# Patient Record
Sex: FEMALE | Race: WHITE | NOT HISPANIC OR LATINO | Employment: OTHER | URBAN - METROPOLITAN AREA
[De-identification: names, ages, dates, MRNs, and addresses within clinical notes are randomized per-mention and may not be internally consistent; named-entity substitution may affect disease eponyms.]

---

## 2022-06-22 ENCOUNTER — NURSE TRIAGE (OUTPATIENT)
Dept: NURSING | Facility: CLINIC | Age: 72
End: 2022-06-22

## 2022-06-22 NOTE — TELEPHONE ENCOUNTER
Daughter calling  She is reporting she is on the way to  her mom at her home in [facility].  She is reporting her mom is an alcoholic, and has been for a lng time. She reports she does not eat or take care of herself in anyway.   Daughter is asking where she can bring her for Detox , and assessment.for treatment.  She was advised to take her to [facility], and she was given the address,    Celina Chun RN   [facility] Nurse Advisor        Reason for Disposition    Substance abuse or dependence: question or problem related to    Depression is main problem or symptom (e.g., feelings of sadness or hopelessness)    Depression and unable to do any of normal activities (e.g., self care, school, work; in comparison to baseline).    Additional Information    Negative: Coma (e.g., not moving, not talking, not responding to stimuli)    Negative: Difficult to awaken or acting confused (e.g., disoriented, slurred speech)    Negative: Seeing, hearing, or feeling things that are not there (i.e., visual, auditory, or tactile hallucinations)    Negative: Slow, shallow and weak breathing    Negative: Seizure    Negative: Violent behavior, or threatening to physically hurt or kill someone    Negative: Patient attempted suicide    Negative: Threatening suicide    Negative: Sounds like a life-threatening emergency to the triager    Negative: Coma (e.g., not moving, not talking, not responding to stimuli)    Negative: Difficult to awaken or acting confused (e.g., disoriented, slurred speech)    Negative: Seeing or hearing or feeling things that are not there (i.e., auditory, visual, or tactile hallucinations)    Negative: Slow, shallow and weak breathing    Negative: Seizure    Negative: Violent behavior, or threatening to physically hurt or kill someone    Negative: Sounds like a life-threatening emergency to the triager    Negative: Patient attempted suicide    Negative: Patient is threatening suicide  now    Negative: Violent behavior, or threatening to physically hurt or kill someone    Negative: Patient is very confused (disoriented, slurred speech) and no other adult (e.g., friend or family member) available    Negative: Difficult to awaken or acting very confused (disoriented, slurred speech) and new onset    Negative: Sounds like a life-threatening emergency to the triager    Negative: Alcohol use, abuse or dependence, question or problem related to    Negative: Drug abuse or dependence, question or problem related to    Negative: Suicide thoughts, threats, attempts, or questions    Negative: Anxiety is main problem or symptom    Protocols used: ALCOHOL ABUSE AND [id number]-ES, SUBSTANCE ABUSE AND [id number]-S-CH, DEPRESSION-A-OH

## 2022-06-28 ENCOUNTER — HOSPITAL ENCOUNTER (INPATIENT)
Facility: CLINIC | Age: 72
LOS: 9 days | Discharge: HOME | DRG: 885 | End: 2022-07-07
Attending: FAMILY MEDICINE | Admitting: INTERNAL MEDICINE
Payer: MEDICARE

## 2022-06-28 DIAGNOSIS — Z20.822 LAB TEST NEGATIVE FOR COVID-19 VIRUS: ICD-10-CM

## 2022-06-28 DIAGNOSIS — E87.1 HYPONATREMIA: ICD-10-CM

## 2022-06-28 DIAGNOSIS — F10.930 ALCOHOL WITHDRAWAL SYNDROME WITHOUT COMPLICATION (H): Primary | ICD-10-CM

## 2022-06-28 DIAGNOSIS — F32.A DEPRESSION, UNSPECIFIED DEPRESSION TYPE: ICD-10-CM

## 2022-06-28 LAB
ALBUMIN SERPL-MCNC: 2.8 G/DL (ref 3.4–5)
ALBUMIN UR-MCNC: 30 MG/DL
ALCOHOL BREATH TEST: 0.07 (ref 0–0.01)
ALP SERPL-CCNC: 174 U/L (ref 40–150)
ALT SERPL W P-5'-P-CCNC: 164 U/L (ref 0–50)
AMPHETAMINES UR QL SCN: NORMAL
ANION GAP SERPL CALCULATED.3IONS-SCNC: 13 MMOL/L (ref 3–14)
APPEARANCE UR: ABNORMAL
AST SERPL W P-5'-P-CCNC: 254 U/L (ref 0–45)
BACTERIA #/AREA URNS HPF: ABNORMAL /HPF
BARBITURATES UR QL: NORMAL
BASOPHILS # BLD AUTO: 0 10E3/UL (ref 0–0.2)
BASOPHILS NFR BLD AUTO: 1 %
BENZODIAZ UR QL: NORMAL
BILIRUB SERPL-MCNC: 1 MG/DL (ref 0.2–1.3)
BILIRUB UR QL STRIP: NEGATIVE
BUN SERPL-MCNC: 8 MG/DL (ref 7–30)
CALCIUM SERPL-MCNC: 8.2 MG/DL (ref 8.5–10.1)
CANNABINOIDS UR QL SCN: NORMAL
CHLORIDE BLD-SCNC: 92 MMOL/L (ref 94–109)
CO2 SERPL-SCNC: 21 MMOL/L (ref 20–32)
COCAINE UR QL: NORMAL
COLOR UR AUTO: YELLOW
CREAT SERPL-MCNC: 0.64 MG/DL (ref 0.52–1.04)
EOSINOPHIL # BLD AUTO: 0 10E3/UL (ref 0–0.7)
EOSINOPHIL NFR BLD AUTO: 0 %
ERYTHROCYTE [DISTWIDTH] IN BLOOD BY AUTOMATED COUNT: 14.2 % (ref 10–15)
ETHANOL SERPL-MCNC: 0.08 G/DL
GFR SERPL CREATININE-BSD FRML MDRD: >90 ML/MIN/1.73M2
GLUCOSE BLD-MCNC: 97 MG/DL (ref 70–99)
GLUCOSE UR STRIP-MCNC: NEGATIVE MG/DL
HCT VFR BLD AUTO: 32.4 % (ref 35–47)
HGB BLD-MCNC: 11.8 G/DL (ref 11.7–15.7)
HGB UR QL STRIP: NEGATIVE
HYALINE CASTS: 34 /LPF
IMM GRANULOCYTES # BLD: 0.1 10E3/UL
IMM GRANULOCYTES NFR BLD: 1 %
KETONES UR STRIP-MCNC: 20 MG/DL
LEUKOCYTE ESTERASE UR QL STRIP: ABNORMAL
LIPASE SERPL-CCNC: 606 U/L (ref 73–393)
LYMPHOCYTES # BLD AUTO: 0.4 10E3/UL (ref 0.8–5.3)
LYMPHOCYTES NFR BLD AUTO: 6 %
MAGNESIUM SERPL-MCNC: 0.8 MG/DL (ref 1.6–2.3)
MCH RBC QN AUTO: 35.4 PG (ref 26.5–33)
MCHC RBC AUTO-ENTMCNC: 36.4 G/DL (ref 31.5–36.5)
MCV RBC AUTO: 97 FL (ref 78–100)
MONOCYTES # BLD AUTO: 0.8 10E3/UL (ref 0–1.3)
MONOCYTES NFR BLD AUTO: 13 %
MUCOUS THREADS #/AREA URNS LPF: PRESENT /LPF
NEUTROPHILS # BLD AUTO: 5 10E3/UL (ref 1.6–8.3)
NEUTROPHILS NFR BLD AUTO: 79 %
NITRATE UR QL: POSITIVE
NRBC # BLD AUTO: 0 10E3/UL
NRBC BLD AUTO-RTO: 0 /100
OPIATES UR QL SCN: NORMAL
PH UR STRIP: 5.5 [PH] (ref 5–7)
PHOSPHATE SERPL-MCNC: 2.1 MG/DL (ref 2.5–4.5)
PLATELET # BLD AUTO: 92 10E3/UL (ref 150–450)
POTASSIUM BLD-SCNC: 3.9 MMOL/L (ref 3.4–5.3)
PROT SERPL-MCNC: 7.2 G/DL (ref 6.8–8.8)
RBC # BLD AUTO: 3.33 10E6/UL (ref 3.8–5.2)
RBC URINE: 1 /HPF
SARS-COV-2 RNA RESP QL NAA+PROBE: NEGATIVE
SODIUM SERPL-SCNC: 126 MMOL/L (ref 133–144)
SODIUM UR-SCNC: 37 MMOL/L
SP GR UR STRIP: 1.02 (ref 1–1.03)
SQUAMOUS EPITHELIAL: <1 /HPF
TRANSITIONAL EPI: 1 /HPF
TSH SERPL DL<=0.005 MIU/L-ACNC: 2.79 MU/L (ref 0.4–4)
UROBILINOGEN UR STRIP-MCNC: NORMAL MG/DL
WBC # BLD AUTO: 6.3 10E3/UL (ref 4–11)
WBC URINE: 25 /HPF

## 2022-06-28 PROCEDURE — 83735 ASSAY OF MAGNESIUM: CPT | Performed by: INTERNAL MEDICINE

## 2022-06-28 PROCEDURE — 82077 ASSAY SPEC XCP UR&BREATH IA: CPT | Performed by: FAMILY MEDICINE

## 2022-06-28 PROCEDURE — 80307 DRUG TEST PRSMV CHEM ANLYZR: CPT | Performed by: FAMILY MEDICINE

## 2022-06-28 PROCEDURE — 84443 ASSAY THYROID STIM HORMONE: CPT | Performed by: FAMILY MEDICINE

## 2022-06-28 PROCEDURE — 258N000003 HC RX IP 258 OP 636: Performed by: INTERNAL MEDICINE

## 2022-06-28 PROCEDURE — 250N000011 HC RX IP 250 OP 636: Performed by: EMERGENCY MEDICINE

## 2022-06-28 PROCEDURE — 96361 HYDRATE IV INFUSION ADD-ON: CPT | Performed by: EMERGENCY MEDICINE

## 2022-06-28 PROCEDURE — 82075 ASSAY OF BREATH ETHANOL: CPT | Performed by: EMERGENCY MEDICINE

## 2022-06-28 PROCEDURE — 87086 URINE CULTURE/COLONY COUNT: CPT | Performed by: FAMILY MEDICINE

## 2022-06-28 PROCEDURE — 84100 ASSAY OF PHOSPHORUS: CPT | Performed by: INTERNAL MEDICINE

## 2022-06-28 PROCEDURE — 250N000011 HC RX IP 250 OP 636: Performed by: INTERNAL MEDICINE

## 2022-06-28 PROCEDURE — 250N000013 HC RX MED GY IP 250 OP 250 PS 637: Performed by: EMERGENCY MEDICINE

## 2022-06-28 PROCEDURE — 82310 ASSAY OF CALCIUM: CPT | Performed by: FAMILY MEDICINE

## 2022-06-28 PROCEDURE — 84300 ASSAY OF URINE SODIUM: CPT | Performed by: INTERNAL MEDICINE

## 2022-06-28 PROCEDURE — 96374 THER/PROPH/DIAG INJ IV PUSH: CPT | Performed by: EMERGENCY MEDICINE

## 2022-06-28 PROCEDURE — 120N000002 HC R&B MED SURG/OB UMMC

## 2022-06-28 PROCEDURE — 99285 EMERGENCY DEPT VISIT HI MDM: CPT | Performed by: EMERGENCY MEDICINE

## 2022-06-28 PROCEDURE — 85025 COMPLETE CBC W/AUTO DIFF WBC: CPT | Performed by: FAMILY MEDICINE

## 2022-06-28 PROCEDURE — 90791 PSYCH DIAGNOSTIC EVALUATION: CPT

## 2022-06-28 PROCEDURE — 99285 EMERGENCY DEPT VISIT HI MDM: CPT | Mod: 25 | Performed by: EMERGENCY MEDICINE

## 2022-06-28 PROCEDURE — 258N000003 HC RX IP 258 OP 636: Performed by: EMERGENCY MEDICINE

## 2022-06-28 PROCEDURE — 83690 ASSAY OF LIPASE: CPT | Performed by: FAMILY MEDICINE

## 2022-06-28 PROCEDURE — 99232 SBSQ HOSP IP/OBS MODERATE 35: CPT | Performed by: INTERNAL MEDICINE

## 2022-06-28 PROCEDURE — 250N000013 HC RX MED GY IP 250 OP 250 PS 637: Performed by: INTERNAL MEDICINE

## 2022-06-28 PROCEDURE — 250N000009 HC RX 250: Performed by: INTERNAL MEDICINE

## 2022-06-28 PROCEDURE — 36415 COLL VENOUS BLD VENIPUNCTURE: CPT | Performed by: FAMILY MEDICINE

## 2022-06-28 PROCEDURE — 87635 SARS-COV-2 COVID-19 AMP PRB: CPT | Performed by: EMERGENCY MEDICINE

## 2022-06-28 PROCEDURE — C9803 HOPD COVID-19 SPEC COLLECT: HCPCS | Performed by: EMERGENCY MEDICINE

## 2022-06-28 PROCEDURE — 81001 URINALYSIS AUTO W/SCOPE: CPT | Performed by: FAMILY MEDICINE

## 2022-06-28 RX ORDER — DIAZEPAM 10 MG/2ML
5-10 INJECTION, SOLUTION INTRAMUSCULAR; INTRAVENOUS EVERY 30 MIN PRN
Status: DISCONTINUED | OUTPATIENT
Start: 2022-06-28 | End: 2022-07-06

## 2022-06-28 RX ORDER — GABAPENTIN 300 MG/1
900 CAPSULE ORAL EVERY 8 HOURS
Status: DISCONTINUED | OUTPATIENT
Start: 2022-06-29 | End: 2022-06-30

## 2022-06-28 RX ORDER — POTASSIUM CHLORIDE 1500 MG/1
20 TABLET, EXTENDED RELEASE ORAL DAILY
COMMUNITY
Start: 2022-01-27

## 2022-06-28 RX ORDER — ONDANSETRON 2 MG/ML
8 INJECTION INTRAMUSCULAR; INTRAVENOUS ONCE
Status: COMPLETED | OUTPATIENT
Start: 2022-06-28 | End: 2022-06-28

## 2022-06-28 RX ORDER — MULTIPLE VITAMINS W/ MINERALS TAB 9MG-400MCG
1 TAB ORAL DAILY
Status: DISCONTINUED | OUTPATIENT
Start: 2022-06-29 | End: 2022-07-07 | Stop reason: HOSPADM

## 2022-06-28 RX ORDER — BUPROPION HYDROCHLORIDE 300 MG/1
300 TABLET ORAL DAILY
Status: ON HOLD | COMMUNITY
Start: 2021-12-14 | End: 2022-07-07

## 2022-06-28 RX ORDER — GABAPENTIN 300 MG/1
300 CAPSULE ORAL EVERY 8 HOURS
Status: DISCONTINUED | OUTPATIENT
Start: 2022-07-04 | End: 2022-06-30

## 2022-06-28 RX ORDER — LOSARTAN POTASSIUM 100 MG/1
100 TABLET ORAL DAILY
COMMUNITY
Start: 2022-03-06

## 2022-06-28 RX ORDER — GABAPENTIN 100 MG/1
100 CAPSULE ORAL EVERY 8 HOURS
Status: DISCONTINUED | OUTPATIENT
Start: 2022-07-06 | End: 2022-06-30

## 2022-06-28 RX ORDER — CLONIDINE HYDROCHLORIDE 0.1 MG/1
0.1 TABLET ORAL EVERY 8 HOURS
Status: DISCONTINUED | OUTPATIENT
Start: 2022-06-28 | End: 2022-07-05

## 2022-06-28 RX ORDER — NYSTATIN 100000 [USP'U]/G
POWDER TOPICAL
COMMUNITY
Start: 2022-03-11

## 2022-06-28 RX ORDER — ALBUTEROL SULFATE 90 UG/1
AEROSOL, METERED RESPIRATORY (INHALATION)
COMMUNITY
Start: 2022-05-18

## 2022-06-28 RX ORDER — HALOPERIDOL 5 MG/ML
2.5-5 INJECTION INTRAMUSCULAR EVERY 6 HOURS PRN
Status: DISCONTINUED | OUTPATIENT
Start: 2022-06-28 | End: 2022-07-06

## 2022-06-28 RX ORDER — GABAPENTIN 300 MG/1
600 CAPSULE ORAL EVERY 8 HOURS
Status: DISCONTINUED | OUTPATIENT
Start: 2022-07-02 | End: 2022-06-30

## 2022-06-28 RX ORDER — SODIUM CHLORIDE 9 MG/ML
INJECTION, SOLUTION INTRAVENOUS CONTINUOUS
Status: DISCONTINUED | OUTPATIENT
Start: 2022-06-28 | End: 2022-07-02

## 2022-06-28 RX ORDER — FLUMAZENIL 0.1 MG/ML
0.2 INJECTION, SOLUTION INTRAVENOUS
Status: DISCONTINUED | OUTPATIENT
Start: 2022-06-28 | End: 2022-07-07 | Stop reason: HOSPADM

## 2022-06-28 RX ORDER — FOLIC ACID 1 MG/1
1 TABLET ORAL DAILY
Status: DISCONTINUED | OUTPATIENT
Start: 2022-06-29 | End: 2022-07-07 | Stop reason: HOSPADM

## 2022-06-28 RX ORDER — PANTOPRAZOLE SODIUM 40 MG/1
40 TABLET, DELAYED RELEASE ORAL
Status: DISCONTINUED | OUTPATIENT
Start: 2022-06-29 | End: 2022-07-07 | Stop reason: HOSPADM

## 2022-06-28 RX ORDER — OLANZAPINE 5 MG/1
5-10 TABLET, ORALLY DISINTEGRATING ORAL EVERY 6 HOURS PRN
Status: DISCONTINUED | OUTPATIENT
Start: 2022-06-28 | End: 2022-07-06

## 2022-06-28 RX ORDER — MULTIPLE VITAMINS W/ MINERALS TAB 9MG-400MCG
1 TAB ORAL DAILY
Status: DISCONTINUED | OUTPATIENT
Start: 2022-06-28 | End: 2022-06-28

## 2022-06-28 RX ORDER — GABAPENTIN 600 MG/1
1200 TABLET ORAL ONCE
Status: DISCONTINUED | OUTPATIENT
Start: 2022-06-28 | End: 2022-06-30

## 2022-06-28 RX ORDER — FOLIC ACID 1 MG/1
1 TABLET ORAL DAILY
Status: DISCONTINUED | OUTPATIENT
Start: 2022-06-28 | End: 2022-06-28

## 2022-06-28 RX ORDER — ONDANSETRON 2 MG/ML
4 INJECTION INTRAMUSCULAR; INTRAVENOUS EVERY 6 HOURS PRN
Status: DISCONTINUED | OUTPATIENT
Start: 2022-06-28 | End: 2022-07-07 | Stop reason: HOSPADM

## 2022-06-28 RX ORDER — TRIAMCINOLONE ACETONIDE 5 MG/G
OINTMENT TOPICAL
COMMUNITY
Start: 2022-03-08

## 2022-06-28 RX ORDER — ALBUTEROL SULFATE 90 UG/1
2 AEROSOL, METERED RESPIRATORY (INHALATION) EVERY 6 HOURS PRN
Status: DISCONTINUED | OUTPATIENT
Start: 2022-06-28 | End: 2022-07-03

## 2022-06-28 RX ORDER — BUPROPION HYDROCHLORIDE 300 MG/1
300 TABLET ORAL DAILY
Status: DISCONTINUED | OUTPATIENT
Start: 2022-06-28 | End: 2022-06-29

## 2022-06-28 RX ORDER — ONDANSETRON 4 MG/1
4 TABLET, ORALLY DISINTEGRATING ORAL ONCE
Status: COMPLETED | OUTPATIENT
Start: 2022-06-28 | End: 2022-06-28

## 2022-06-28 RX ORDER — LOSARTAN POTASSIUM 100 MG/1
100 TABLET ORAL DAILY
Status: DISCONTINUED | OUTPATIENT
Start: 2022-06-28 | End: 2022-06-30

## 2022-06-28 RX ORDER — FUROSEMIDE 20 MG
20 TABLET ORAL DAILY
COMMUNITY
Start: 2022-03-06

## 2022-06-28 RX ORDER — DIAZEPAM 5 MG
10 TABLET ORAL EVERY 30 MIN PRN
Status: DISCONTINUED | OUTPATIENT
Start: 2022-06-28 | End: 2022-07-06

## 2022-06-28 RX ORDER — SIMVASTATIN 20 MG
20 TABLET ORAL AT BEDTIME
COMMUNITY
Start: 2022-03-06

## 2022-06-28 RX ORDER — DIAZEPAM 5 MG
5-20 TABLET ORAL EVERY 30 MIN PRN
Status: DISCONTINUED | OUTPATIENT
Start: 2022-06-28 | End: 2022-07-06

## 2022-06-28 RX ORDER — SODIUM CHLORIDE 9 MG/ML
INJECTION, SOLUTION INTRAVENOUS ONCE
Status: DISCONTINUED | OUTPATIENT
Start: 2022-06-28 | End: 2022-07-07 | Stop reason: HOSPADM

## 2022-06-28 RX ADMIN — LOSARTAN POTASSIUM 100 MG: 100 TABLET, FILM COATED ORAL at 21:21

## 2022-06-28 RX ADMIN — FOLIC ACID: 5 INJECTION, SOLUTION INTRAMUSCULAR; INTRAVENOUS; SUBCUTANEOUS at 23:03

## 2022-06-28 RX ADMIN — SODIUM CHLORIDE: 9 INJECTION, SOLUTION INTRAVENOUS at 21:20

## 2022-06-28 RX ADMIN — DIAZEPAM 10 MG: 5 TABLET ORAL at 22:06

## 2022-06-28 RX ADMIN — DIAZEPAM 10 MG: 5 TABLET ORAL at 19:00

## 2022-06-28 RX ADMIN — ONDANSETRON 8 MG: 2 INJECTION INTRAMUSCULAR; INTRAVENOUS at 16:14

## 2022-06-28 RX ADMIN — ONDANSETRON 4 MG: 4 TABLET, ORALLY DISINTEGRATING ORAL at 21:07

## 2022-06-28 RX ADMIN — SODIUM CHLORIDE 1000 ML: 9 INJECTION, SOLUTION INTRAVENOUS at 16:13

## 2022-06-28 ASSESSMENT — ACTIVITIES OF DAILY LIVING (ADL)
ADLS_ACUITY_SCORE: 32
ADLS_ACUITY_SCORE: 35
ADLS_ACUITY_SCORE: 32

## 2022-06-28 NOTE — ED NOTES
"Diagnostic Evaluation Consultation  Crisis Assessment    Patient was assessed: remote  Patient location: [facility] ED  Was a release of information signed: No. Reason: pt had not providers to release to      Referral Data and Chief Complaint  Juani is a 72  year old, who uses she/her pronouns, and presents to the ED with family/friends. Patient is referred to the ED by family/friends. Patient is presenting to the ED for the following concerns: mental health evaluation, medical evaluation.      Informed Consent and Assessment Methods     Patient is her own guardian. Writer met with patient and explained the crisis assessment process, including applicable information disclosures and limits to confidentiality, assessed understanding of the process, and obtained consent to proceed with the assessment. Patient was observed to be able to participate in the assessment as evidenced by verbal ackowledgement. Assessment methods included conducting a formal interview with patient, review of medical records, collaboration with medical staff, and obtaining relevant collateral information from family and community providers when available..     Over the course of this crisis assessment provided reassurance, offered validation, engaged patient in problem solving and disposition planning and worked with patient on safety and aftercare planning. Patient's response to interventions was positive     Summary of Patient Situation       Pt presents the to ED with her daughter, daughter has concerns for pt's health and alcohol abuse. Pt denied abusing alcohol and does not believe she has a problem, she did say she drinks whiskey daily and she uses it as a coping mechanism for her depression. Pt reported she feels \"miserable all over\" and is nauseas and has \"arthritis everywhere.\" Pt reported that she started feeling depressed two years ago when she had a back surgery that lasted 10 hours, she said she felt the surgery \"threw off her chemical " "balance.\" Pt is taking Wellbutrin and Prozac but reports that it is not helping. Pt denies SI, HI, SIB and hallucinations. Pt is not involved in any out patient mental health services but is open to seeing a therapist and in medication management. [place] pt reports she likes to swim, she reported she used to love to play golf but is unable to do so now due to her physical state. Pt denies any mental health admissions and denies any suicide attempts. Pt is retired and said she has been sleeping a lot and does not do much during the day, she reported she likes watching old TV shows. Pt is being admitted medically due to her low sodium.              Collateral Information    Pt's daughter, Mikey notes   Risk Assessment     ESS-6  1.a. Over the past 2 weeks, have you had thoughts of killing yourself? No  1.b. Have you ever attempted to kill yourself and, if yes, when did this last happen? No   2. Recent or current suicide plan? No   3. Recent or current intent to act on ideation? No  4. Lifetime psychiatric hospitalization? No  5. Pattern of excessive substance use? Yes  6. Current irritability, agitation, or aggression? No  Scoring note: BOTH 1a and 1b must be yes for it to score 1 point, if both are not yes it is zero. All others are 1 point per number. If all questions 1a/1b - 6 are no, risk is negligible. If one of 1a/1b is yes, then risk is mild. If either question 2 or 3, but not both, is yes, then risk is automatically moderate regardless of total score. If both 2 and 3 are yes, risk is automatically high regardless of total score.      Score: 1, mild risk      Does the patient have access to lethal means? No     Does the patient engage in non-suicidal self-injurious behavior (NSSI/SIB)? no     Does the patient have thoughts of harming others? No     Is the patient engaging in sexually inappropriate behavior?  no      Current Substance Abuse     Is there " recent substance abuse? Alcohol abuse, drinks two whiskey drinks daily for at least the last two years but likely longer     Was a urine drug screen or blood alcohol level obtained: yes, LUIS ANGEL  .074   Mental Status Exam     Affect: Appropriate   Appearance: Disheveled    Attention Span/Concentration: Attentive?    Eye Contact: Engaged   Fund of Knowledge: Appropriate    Language /Speech Content: Fluent   Language /Speech Volume: Normal    Language /Speech Rate/Productions: Normal    Recent Memory: Variable   Remote Memory: Variable   Mood: Depressed and Normal    Orientation to Person: Yes    Orientation to Place: Yes   Orientation to Time of Day: Yes    Orientation to Date: Yes    Situation (Do they understand why they are here?): Yes    Psychomotor Behavior: Normal and Underactive    Thought Content: Clear   Thought Form: Intact      History of commitment: No           Medication    Psychotropic medications: Wellbutrin and Prozac  Medication changes made in the last two weeks: No     Current Care Team    Primary Care Provider: Kelsie Ledezma MD  Psychiatrist: No  Therapist: No  : No     CTSS or ARMHS: No  ACT Team: No  Other: No    Diagnosis    Major depressive disorder, Recurrent episode, Moderate F33.1  Alcohol use disorder, Moderate F10.20    Clinical Summary and Substantiation of Recommendations      Pt will be medically admitted due to her low sodium levels, the doctor wants a medical evaluation done. Pt has never engaged in therapy before but is wanting to try it to help with her depression, pt is also interested in medication management, currently her mental health medications are prescribed by her PCP and she reported they are not helping and she would like someone else to help with that. Pt reports that she has been feeling depressed for two years, pt was not interested in CD treatment at this time but it would appropriate to look at further down the line for pt's  recovery.    Disposition    Recommended disposition: Medical Admission: low sodium levels       Reviewed case and recommendations with attending provider. Attending Name: Dr. Rocha     Attending concurs with disposition: Yes       Patient concurs with disposition: Yes       Guardian concurs with disposition: NA      Final disposition: Medical admission: low sodium levels .             Assessment Details    Patient interview started at: 5:20pm and completed at: 5:45pm     Total duration spent on the patient case in minutes: 1.25 hrs      CPT code(s) utilized: 87376 - Psychotherapy for Crisis - 60 (30-74*) min       Heidy Osorio MSW, MSW, LICSW  DEC - Triage & Transition Services

## 2022-06-28 NOTE — ED PROVIDER NOTES
"ED Provider Note  [facility]      History     Chief Complaint   Patient presents with     Mental Health Problem     Drug / Alcohol Assessment     Increasing depression, pt denies SI, however states \"I would like to just go to sleep.\" Pt reports abusing OTC pain meds, and alcohol for neck pain. Pt seeking detox. Pt denies hx seizures with withdrawal.      HPI  Juani Hernandez is a 72 year old female who was brought in by her daughter for increasing depression, poor appetite, not eating well the last few days.  She denies suicidal ideation or any auditory visual hallucinations.  She states she has a lot of arthritis and has a lot of chronic pain issues, but is able to ambulate.  Patient tells me that she is drinking daily since she retired, so its been several years.  She states that she is drinking about 2 drinks a day of whiskey and reports that she does not have withdrawal symptoms typically.  Denies any history of alcohol withdrawal seizures.    Past Medical History  No past medical history on file.  No past surgical history on file.  Reports that she has had 3 back surgeries, most recently 2 years ago.  buPROPion (WELLBUTRIN XL) 300 MG 24 hr tablet  FLUoxetine (PROZAC) 20 MG capsule  furosemide (LASIX) 20 MG tablet  losartan (COZAAR) 100 MG tablet  NYAMYC 723121 UNIT/GM external powder  potassium chloride ER (KLOR-CON M) 20 MEQ CR tablet  simvastatin (ZOCOR) 20 MG tablet  triamcinolone (KENALOG) 0.5 % external ointment  VENTOLIN  (90 Base) MCG/ACT inhaler      No Known Allergies  Family History  No family history on file.  Social History       Past medical history, past surgical history, medications, allergies, family history, and social history were reviewed with the patient. No additional pertinent items.       Review of Systems  A complete review of systems was performed with pertinent positives and negatives noted in the HPI, and all other systems negative.    Physical Exam "   BP: 131/63  Pulse: 88  Temp: 98.1  F (36.7  C)  Resp: 18  SpO2: 99 %  Physical Exam    GEN:  Alert, well developed, no acute distress  HEENT:  PERRL, EOMI, Mucous membranes are moist.   Cardio:  RRR, no murmur, radial pulses equal bilaterally  PULM:  Lungs clear, good air movement, no wheezes, rales   Abd:  Soft, normal bowel sounds, no focal tenderness  Back exam:  No CVA tenderness  Musculoskeletal:  normal range of motion, no lower extremity swelling or calf tenderness  Neuro:  Alert and oriented X3, Follows commands, moving all extremities spontaneously   Skin:  Warm, dry   Psychiatric: Depressed mood and affect, reports feeling depressed, denies suicidal ideation, denies auditory visual hallucinations  ED Course      Procedures         Labs were reviewed by me and results are shown below.  Patient's sodium is low.  I do not see another previous sodium level more recent than 2018.  It was normal at that time.  She was treated with normal saline IV.  Patient's LFTs and lipase are also elevated.  She is not complaining of any abdominal pain, but does have some nausea.  I have ordered right upper quadrant ultrasound and this is pending at the time of shift change.    Patient was seen by the DEC .  She does have continuing depression and is interested in therapy and medication management.  The DEC  will set up outpatient therapy for her as well as psychiatry follow-up.     Results for orders placed or performed during the hospital encounter of 06/28/22   Comprehensive metabolic panel     Status: Abnormal   Result Value Ref Range    Sodium 126 (L) 133 - 144 mmol/L    Potassium 3.9 3.4 - 5.3 mmol/L    Chloride 92 (L) 94 - 109 mmol/L    Carbon Dioxide (CO2) 21 20 - 32 mmol/L    Anion Gap 13 3 - 14 mmol/L    Urea Nitrogen 8 7 - 30 mg/dL    Creatinine 0.64 0.52 - 1.04 mg/dL    Calcium 8.2 (L) 8.5 - 10.1 mg/dL    Glucose 97 70 - 99 mg/dL    Alkaline Phosphatase 174 (H) 40 - 150 U/L     (H) 0 - 45  U/L     (H) 0 - 50 U/L    Protein Total 7.2 6.8 - 8.8 g/dL    Albumin 2.8 (L) 3.4 - 5.0 g/dL    Bilirubin Total 1.0 0.2 - 1.3 mg/dL    GFR Estimate >90 >60 mL/min/1.73m2   Lipase     Status: Abnormal   Result Value Ref Range    Lipase 606 (H) 73 - 393 U/L   TSH with free T4 reflex     Status: Normal   Result Value Ref Range    TSH 2.79 0.40 - 4.00 mU/L   Ethyl Alcohol Level     Status: Abnormal   Result Value Ref Range    Alcohol ethyl 0.08 (H) <=0.01 g/dL   CBC with platelets and differential     Status: Abnormal   Result Value Ref Range    WBC Count 6.3 4.0 - 11.0 10e3/uL    RBC Count 3.33 (L) 3.80 - 5.20 10e6/uL    Hemoglobin 11.8 11.7 - 15.7 g/dL    Hematocrit 32.4 (L) 35.0 - 47.0 %    MCV 97 78 - 100 fL    MCH 35.4 (H) 26.5 - 33.0 pg    MCHC 36.4 31.5 - 36.5 g/dL    RDW 14.2 10.0 - 15.0 %    Platelet Count 92 (L) 150 - 450 10e3/uL    % Neutrophils 79 %    % Lymphocytes 6 %    % Monocytes 13 %    % Eosinophils 0 %    % Basophils 1 %    % Immature Granulocytes 1 %    NRBCs per 100 WBC 0 <1 /100    Absolute Neutrophils 5.0 1.6 - 8.3 10e3/uL    Absolute Lymphocytes 0.4 (L) 0.8 - 5.3 10e3/uL    Absolute Monocytes 0.8 0.0 - 1.3 10e3/uL    Absolute Eosinophils 0.0 0.0 - 0.7 10e3/uL    Absolute Basophils 0.0 0.0 - 0.2 10e3/uL    Absolute Immature Granulocytes 0.1 <=0.4 10e3/uL    Absolute NRBCs 0.0 10e3/uL   Alcohol breath test POCT     Status: Abnormal   Result Value Ref Range    Alcohol Breath Test 0.074 (A) 0.00 - 0.01   CBC with platelets differential     Status: Abnormal    Narrative    The following orders were created for panel order CBC with platelets differential.  Procedure                               Abnormality         Status                     ---------                               -----------         ------                     CBC with platelets and d...[[id number]]  Abnormal            Final result                 Please view results for these tests on the individual orders.   Urine Drugs of  Abuse Screen     Status: None (In process)    Narrative    The following orders were created for panel order Urine Drugs of Abuse Screen.  Procedure                               Abnormality         Status                     ---------                               -----------         ------                     Drug abuse screen 1 urin...[[id number]]                      In process                   Please view results for these tests on the individual orders.     Medications   sodium chloride 0.9% infusion (has no administration in time range)   0.9% sodium chloride BOLUS (0 mLs Intravenous Stopped 6/28/22 1805)   ondansetron (ZOFRAN) injection 8 mg (8 mg Intravenous Given 6/28/22 1614)        Assessments & Plan (with Medical Decision Making)   Patient presents with feelings of depression, decreased energy, decreased p.o. intake with decreased appetite.  She does not have suicidal thoughts, however, continues to feel depressed since her back surgery couple of years ago.  Patient is hyponatremic today, so will be admitted to the medicine service.  She is not having abdominal pain, but does have nausea and elevated LFTs and mildly elevated lipase.  Right upper quadrant ultrasound is pending to evaluate this further.    I have reviewed the nursing notes. I have reviewed the findings, diagnosis, plan and need for follow up with the patient.    New Prescriptions    No medications on file       Final diagnoses:   Hyponatremia   Depression, unspecified depression type       --  Lauren Rocha  [facility] EMERGENCY DEPARTMENT  6/28/2022     Lauren Rocha MD  06/28/22 3673

## 2022-06-29 ENCOUNTER — APPOINTMENT (OUTPATIENT)
Dept: ULTRASOUND IMAGING | Facility: CLINIC | Age: 72
DRG: 885 | End: 2022-06-29
Attending: EMERGENCY MEDICINE
Payer: MEDICARE

## 2022-06-29 LAB
ALBUMIN SERPL-MCNC: 2.5 G/DL (ref 3.4–5)
ALP SERPL-CCNC: 154 U/L (ref 40–150)
ALT SERPL W P-5'-P-CCNC: 133 U/L (ref 0–50)
ANION GAP SERPL CALCULATED.3IONS-SCNC: 8 MMOL/L (ref 3–14)
AST SERPL W P-5'-P-CCNC: 183 U/L (ref 0–45)
BILIRUB SERPL-MCNC: 0.9 MG/DL (ref 0.2–1.3)
BUN SERPL-MCNC: 9 MG/DL (ref 7–30)
CALCIUM SERPL-MCNC: 8.1 MG/DL (ref 8.5–10.1)
CHLORIDE BLD-SCNC: 97 MMOL/L (ref 94–109)
CO2 SERPL-SCNC: 26 MMOL/L (ref 20–32)
CREAT SERPL-MCNC: 1.12 MG/DL (ref 0.52–1.04)
GFR SERPL CREATININE-BSD FRML MDRD: 52 ML/MIN/1.73M2
GLUCOSE BLD-MCNC: 130 MG/DL (ref 70–99)
GLUCOSE BLDC GLUCOMTR-MCNC: 135 MG/DL (ref 70–99)
HOLD SPECIMEN: NORMAL
HOLD SPECIMEN: NORMAL
MAGNESIUM SERPL-MCNC: 3.2 MG/DL (ref 1.6–2.3)
MAGNESIUM SERPL-MCNC: 3.4 MG/DL (ref 1.6–2.3)
OSMOLALITY SERPL: 273 MMOL/KG (ref 280–301)
PHOSPHATE SERPL-MCNC: 1.9 MG/DL (ref 2.5–4.5)
PHOSPHATE SERPL-MCNC: 1.9 MG/DL (ref 2.5–4.5)
POTASSIUM BLD-SCNC: 4.1 MMOL/L (ref 3.4–5.3)
PROT SERPL-MCNC: 6.3 G/DL (ref 6.8–8.8)
SODIUM SERPL-SCNC: 131 MMOL/L (ref 133–144)

## 2022-06-29 PROCEDURE — 83930 ASSAY OF BLOOD OSMOLALITY: CPT | Performed by: INTERNAL MEDICINE

## 2022-06-29 PROCEDURE — 250N000013 HC RX MED GY IP 250 OP 250 PS 637: Performed by: INTERNAL MEDICINE

## 2022-06-29 PROCEDURE — 36415 COLL VENOUS BLD VENIPUNCTURE: CPT | Performed by: INTERNAL MEDICINE

## 2022-06-29 PROCEDURE — 76705 ECHO EXAM OF ABDOMEN: CPT | Mod: 26 | Performed by: RADIOLOGY

## 2022-06-29 PROCEDURE — 83735 ASSAY OF MAGNESIUM: CPT | Performed by: STUDENT IN AN ORGANIZED HEALTH CARE EDUCATION/TRAINING PROGRAM

## 2022-06-29 PROCEDURE — 82040 ASSAY OF SERUM ALBUMIN: CPT | Performed by: INTERNAL MEDICINE

## 2022-06-29 PROCEDURE — 76705 ECHO EXAM OF ABDOMEN: CPT

## 2022-06-29 PROCEDURE — 84100 ASSAY OF PHOSPHORUS: CPT | Performed by: INTERNAL MEDICINE

## 2022-06-29 PROCEDURE — 80053 COMPREHEN METABOLIC PANEL: CPT | Performed by: INTERNAL MEDICINE

## 2022-06-29 PROCEDURE — 99232 SBSQ HOSP IP/OBS MODERATE 35: CPT | Performed by: PSYCHIATRY & NEUROLOGY

## 2022-06-29 PROCEDURE — 250N000011 HC RX IP 250 OP 636: Performed by: STUDENT IN AN ORGANIZED HEALTH CARE EDUCATION/TRAINING PROGRAM

## 2022-06-29 PROCEDURE — 36415 COLL VENOUS BLD VENIPUNCTURE: CPT | Performed by: STUDENT IN AN ORGANIZED HEALTH CARE EDUCATION/TRAINING PROGRAM

## 2022-06-29 PROCEDURE — 250N000013 HC RX MED GY IP 250 OP 250 PS 637: Performed by: EMERGENCY MEDICINE

## 2022-06-29 PROCEDURE — 83735 ASSAY OF MAGNESIUM: CPT | Performed by: INTERNAL MEDICINE

## 2022-06-29 PROCEDURE — 120N000002 HC R&B MED SURG/OB UMMC

## 2022-06-29 PROCEDURE — 250N000011 HC RX IP 250 OP 636: Performed by: INTERNAL MEDICINE

## 2022-06-29 PROCEDURE — 84100 ASSAY OF PHOSPHORUS: CPT | Performed by: STUDENT IN AN ORGANIZED HEALTH CARE EDUCATION/TRAINING PROGRAM

## 2022-06-29 PROCEDURE — 99233 SBSQ HOSP IP/OBS HIGH 50: CPT | Performed by: INTERNAL MEDICINE

## 2022-06-29 RX ORDER — DULOXETIN HYDROCHLORIDE 30 MG/1
30 CAPSULE, DELAYED RELEASE ORAL DAILY
Status: DISCONTINUED | OUTPATIENT
Start: 2022-06-30 | End: 2022-07-06

## 2022-06-29 RX ORDER — MAGNESIUM SULFATE HEPTAHYDRATE 40 MG/ML
4 INJECTION, SOLUTION INTRAVENOUS EVERY 4 HOURS
Status: COMPLETED | OUTPATIENT
Start: 2022-06-29 | End: 2022-06-29

## 2022-06-29 RX ADMIN — MULTIPLE VITAMINS W/ MINERALS TAB 1 TABLET: TAB at 09:08

## 2022-06-29 RX ADMIN — POTASSIUM & SODIUM PHOSPHATES POWDER PACK 280-160-250 MG 2 PACKET: 280-160-250 PACK at 20:06

## 2022-06-29 RX ADMIN — THIAMINE HCL TAB 100 MG 100 MG: 100 TAB at 09:08

## 2022-06-29 RX ADMIN — GABAPENTIN 900 MG: 300 CAPSULE ORAL at 02:21

## 2022-06-29 RX ADMIN — GABAPENTIN 900 MG: 300 CAPSULE ORAL at 20:06

## 2022-06-29 RX ADMIN — FOLIC ACID 1 MG: 1 TABLET ORAL at 09:08

## 2022-06-29 RX ADMIN — MAGNESIUM SULFATE HEPTAHYDRATE 4 G: 40 INJECTION, SOLUTION INTRAVENOUS at 06:07

## 2022-06-29 RX ADMIN — POTASSIUM & SODIUM PHOSPHATES POWDER PACK 280-160-250 MG 2 PACKET: 280-160-250 PACK at 13:21

## 2022-06-29 RX ADMIN — POTASSIUM & SODIUM PHOSPHATES POWDER PACK 280-160-250 MG 2 PACKET: 280-160-250 PACK at 16:32

## 2022-06-29 RX ADMIN — PANTOPRAZOLE SODIUM 40 MG: 40 TABLET, DELAYED RELEASE ORAL at 09:08

## 2022-06-29 RX ADMIN — POTASSIUM & SODIUM PHOSPHATES POWDER PACK 280-160-250 MG 2 PACKET: 280-160-250 PACK at 22:44

## 2022-06-29 RX ADMIN — ONDANSETRON 4 MG: 2 INJECTION INTRAMUSCULAR; INTRAVENOUS at 02:30

## 2022-06-29 RX ADMIN — POTASSIUM & SODIUM PHOSPHATES POWDER PACK 280-160-250 MG 2 PACKET: 280-160-250 PACK at 09:08

## 2022-06-29 RX ADMIN — GABAPENTIN 900 MG: 300 CAPSULE ORAL at 11:30

## 2022-06-29 RX ADMIN — FLUOXETINE 20 MG: 20 CAPSULE ORAL at 09:08

## 2022-06-29 RX ADMIN — CLONIDINE HYDROCHLORIDE 0.1 MG: 0.1 TABLET ORAL at 11:30

## 2022-06-29 RX ADMIN — CLONIDINE HYDROCHLORIDE 0.1 MG: 0.1 TABLET ORAL at 02:22

## 2022-06-29 RX ADMIN — LOSARTAN POTASSIUM 100 MG: 100 TABLET, FILM COATED ORAL at 09:07

## 2022-06-29 RX ADMIN — MAGNESIUM SULFATE HEPTAHYDRATE 4 G: 40 INJECTION, SOLUTION INTRAVENOUS at 02:09

## 2022-06-29 ASSESSMENT — ACTIVITIES OF DAILY LIVING (ADL)
ADLS_ACUITY_SCORE: 32

## 2022-06-29 NOTE — PROGRESS NOTES
[facility]    Medicine Progress Note - Hospitalist Service, GOLD TEAM 16    Date of Admission:  6/28/2022    Assessment & Plan        Juani Hernandez is a 73 yo female w/ h/o depression, alcohol abuse, HTN, HLD, GERD, osteopenia, spondylolisthesis of the lumbar region with prior lumbar fusion, DJD and chronic pain.  She states she drinks 2 drinks of whiskey daily.  She says she drinks whiskey daily so she does not exhibit withdrawal symptoms.  History is negative for alcohol withdrawal seizures or DTs.  She was brought to [facility] ED by her daughter for evaluation of increased depression, poor appetite, decreased oral intake.  History is negative for suicidal or homicidal ideation.  Evaluation in the ED on 6/28/22 revealed BAL 0.08 g/dl, Na 126 and elevated transaminase.  Admitted to hospital service for detoxification and treatment of hyponatremia.      Alcohol abuse  Alcohol intoxication  ---   BAL was 0.08 g/dL  ---   Urine tox screen negative at admit.  ---   Continue Valium per alcohol withdrawal treatment protocol using CIWA score  ---   Continue gabapentin taper  ---   Continue clonidine for adrenergic hyperactivity symptoms  ---   Continue MIVF, MVI, folate and thiamine supplements    Severe depression  ---   Has flat affect and depressed mood  ---   She says her PTA meds do not help  ---   Continue fluoxetine and bupropion for now  ---   Psychiatry service consulted  ---   Patient is willing to transfer to inpatient psychiatry once she completed detox    Alcoholic hepatitis  ---   AST/ALT ratio c/w alcoholic liver disease  ---   Abdominal ultrasound earlier this morning revealed steatosis  ---   Abstinence from alcohol strongly recommended    Severe hyponatremia  ---   Pt drinks whiskey and not beer thus less likely due to beer potomania  ---   Na level was 126 admit  ---   Serum osmolality 273 (low)  ---   Urine sodium is 37  ---   Urine osmolality  pending  ---   Na is up to 131 this am  ---   Continue maintenance IV fluid  ---   Check Na level later today.  Avoid correction more than 10 mmol per 24 hr.    HTN  ---   Well-controlled  ---   Continue PTA losartan  ---   Hold PTA Lasix since patient is receiving IVF hydration    HLD  ---   PTA simvastatin on hold for transaminitis  ---   Continue holding for another day    Hypophosphatemia  ---   On replacement protocol    Elevated lipase at 606  ---   Denied epigastric pain radiating to her back  ---   Denied nausea or vomiting this morning  ---   No evidence of acute pancreatitis and lipase of 606 does not meet criteria as well    Thrombocytopenia  ---   Platelet count is 92k  ---   Due to alcohol affecting bone marrow and possibly platelet sequestration in the spleen  ---   No evidence of bleeding  ---   Abstinence from alcohol strongly recommended    Severe obesity with BMI of 40.92 kg/m2  ---   Weight loss strongly recommended    Hypophosphatemia  ---   Start replacement protocol    Universal COVID-19 screening  ---   Fully vaccinated but not boosted  ---   SARS-CoV-2 PCR negative on 6/28/2022          Diet: Fluid restriction 2000 ML FLUID  Regular Diet Adult    DVT Prophylaxis: Pneumatic Compression Devices  Will Catheter: Not present  Central Lines: None  Cardiac Monitoring: None  Code Status: Full Code      Disposition Plan   Anticipate transfer to close mental health unit for treatment of severe depression in 1 to 2 days.      The patient's care was discussed with patient's daughter who was at bedside        Jannet Calhoun MD  Hospitalist Service, [address]  Securely message with the Vocera Web Console (learn more here)  Text page via [employer] Paging/Directory   Please see signed in provider for up to date coverage information      ______________________________________________________________________    Interval History   Patient states she  feels much better compared to yesterday.  However, she is still severely depressed and would like to talk to psychiatry as soon as possible.  She states her PTA fluoxetine and bupropion were not helping.  Denies epigastric pain, right upper quadrant abdominal pain, nausea or vomiting.  Patient's daughter at bedside during this eval.    Data reviewed today: I reviewed all medications, new labs and imaging results over the last 24 hours.     Physical Exam   Vital Signs: Temp: 97.2  F (36.2  C) Temp src: Oral BP: 138/54 Pulse: 74   Resp: 16 SpO2: 97 % O2 Device: Nasal cannula Oxygen Delivery: 1 LPM  Weight: 238 lbs 6.4 oz  General: Morbidly obese, aao x 3, NAD.  HEENT:  NC/AT, PERRL, EOMI, neck supple, no thyromegaly, op clear, mmm.  CVS:  NL s 1 and s2, no m/r/g.  Lungs:  CTA B/L.   Abd:  Soft, + bs, NT, no rebound or gaurding, no fluid shift.  Ext:  No c/c.  Lymph:  No edema.  Neuro:  Nonfocal.  Musculoskeletal: No calf tenderness to palpation.    Skin:  No rash.  Psychiatry:  Flat affect and depressed mood      Data   Recent Labs   Lab 06/29/22  1119 06/28/22  1608   WBC  --  6.3   HGB  --  11.8   MCV  --  97   PLT  --  92*   * 126*   POTASSIUM 4.1 3.9   CHLORIDE 97 92*   CO2 26 21   BUN 9 8   CR 1.12* 0.64   ANIONGAP 8 13   YOSEPH 8.1* 8.2*   * 97   ALBUMIN 2.5* 2.8*   PROTTOTAL 6.3* 7.2   BILITOTAL 0.9 1.0   ALKPHOS 154* 174*   * 164*   * 254*   LIPASE  --  606*     Recent Results (from the past 24 hour(s))   US Abdomen Limited    Narrative    EXAMINATION: US ABDOMEN LIMITED, 6/29/2022 8:52 AM     INDICATION: Elevated LFTs, lipase, eval for biliary obstruction,  Increased LFTs, lipase    COMPARISON: None.    TECHNIQUE: The abdomen was scanned in the standard fashion with  specialized ultrasound transducer(s) using both gray scale and limited  color/spectral Doppler techniques.    FINDINGS:   Fluid: No evidence of ascites or pleural effusions.    Liver: The liver is diffusely echogenic,  measuring 16.6 cm in  craniocaudal dimension. No focal hepatic mass. No intrahepatic biliary  dilatation. The main portal vein is patent with antegrade flow.    Gallbladder: The gallbladder is well distended and of normal  morphology. No wall thickening, pericholecystic fluid, sonographic  Boone's sign, or evidence of cholelithiasis.    Bile Ducts: Normal caliber intra and extrahepatic biliary tree. The  common bile duct measures 2 mm in diameter.    Pancreas: Visualized portions of the pancreas are unremarkable.     Kidney: The right kidney measures 12.0 cm in long dimension. No  hydronephrosis, hydroureter, shadowing renal calculi, or solid mass.  The capsule and parenchyma demonstrate normal echogenicity.    Aorta and IVC: The visualized portions of the aorta and IVC are  unremarkable.       Impression    IMPRESSION:   1.  Diffusely echogenic liver suggesting steatosis. No suspicious  hepatic mass.  2.  Unremarkable gallbladder. No biliary dilatation.    I have personally reviewed the examination and initial interpretation  and I agree with the findings.    PINEDA ANDERSON DO         SYSTEM ID:  [id number]     Medications     sodium chloride 125 mL/hr at 06/28/22 2120       buPROPion  300 mg Oral Daily     cloNIDine  0.1 mg Oral Q8H     FLUoxetine  20 mg Oral Daily     folic acid  1 mg Oral Daily     [START ON 7/6/2022] gabapentin  100 mg Oral Q8H     [START ON 7/4/2022] gabapentin  300 mg Oral Q8H     [START ON 7/2/2022] gabapentin  600 mg Oral Q8H     gabapentin  900 mg Oral Q8H     gabapentin  1,200 mg Oral Once     losartan  100 mg Oral Daily     multivitamin w/minerals  1 tablet Oral Daily     pantoprazole  40 mg Oral QAM AC     potassium & sodium phosphates  2 packet Oral or Feeding Tube Q4H     sodium chloride   Intravenous Once     thiamine  100 mg Oral Daily     thiamine  100 mg Oral Daily

## 2022-06-29 NOTE — PLAN OF CARE
Patient is alert and oriented x4, and able to communicate needs. VSS. Room air. Denies pain during the shift. Assist of x1 with transfers. Ultrasound was done this morning. Eating well. Family visited. Patient mag lab result was 3.2, updated the provider. CIWA score low, no valium given. Bed in the lowest position, and call light within reach. Will continue with current POC.

## 2022-06-29 NOTE — H&P
[facility] History and Physical  Department of Internal Medicine    Patient Name: Juani Hernandez MRN# [id number]   Age: 72 year old YOB: 1950     Date of Admission:6/28/2022    Primary care provider: No Ref-Primary, Physician  Date of Service: 6/28/2022  Admitting Team: [facility]         Assessment and Plan:   Juani Hernandez is a 72 year old female with past medical history of major depressive disorders, EtOH use disorder who presented to the ED accompanied by her daughter for evaluation of depression.  States she has been using her antidepressants regularly but is increasingly feeling depressed.  She admits to drinking alcohol daily and her last drink was last night.  Denies past history of EtOH withdrawal syndrome.  Denies history of EtOH related seizures.  Patient according to her daughter would start feeling shaky if she does not drink alcohol.  She does not have a recent follow-up with her psychiatrist.  Denies any suicidal ideations.  Patient also denies dizziness, lightheadedness, shortness of breath, chest pain.  Denied dysuria.  However her urinalysis showed evidence of UTI.  She is afebrile without significant leukocytosis.  In the ED patient is noted to have a sodium of 126 elevated liver enzymes and lipase.  Serum creatinine level within normal limits.  She is awake alert oriented x3 without signs of anxiety or agitation.  She was seen by mental health while in the ED.  Hospitalist service requested to admit patient for medical management.  Once medically stable patient will be discharged to inpatient psych for treatment of her depressive disorders.  In the ED she did receive a liter of normal saline IV bolus.  She also received Zofran 8 mg IV x1 and an additional 4 mg for nausea and dry heaving.  She did receive diazepam 10 mg x 1  EtOH use disorder.  Serum alcohol level 0.08.  We will give banana bag.  Patient will be admitted on [facility] protocol  Major depressive disorders, recurrent,  severe.  Inpatient psych consult.  EtOH dependency.  Chemical dependency consult  Hyponatremia, chronic.  Patient is ANO x3.  We will check serum t, and urine sodium.  2 L fluid restriction.  We will monitor with daily renal panel.  Hypoalbuminemia  Thrombocytopenia likely in the setting of alcohol use disorder.    Abnormal LFTs suspect due to alcohol.  Patient is on simvastatin.  We will hold statin.  Lipase level elevated.  Abdomen is soft and nondistended, no tenderness guarding or rigidity.  Likely due to alcohol.  We will start patient on normal saline 125 cc/h trend.  Advance diet as tolerated  Nausea with vomiting.  Dry heaves.  Suspect due to alcohol and use of ibuprofen.  Continue PPI, Zofran as needed.    CODE: Full code  Diet/IVF: Regular diet  DVT ppx: SCD's while bedbound   Disposition/Admission Status: Inpatient psych    Mini Molina MD  Internal Medicine Staff Hospitalist  [facility]  Pager: [phone]       Chief Complaint:   Depression         HPI:   Juani Hernandez is a 72 year old female with past medical history of major depressive disorders, EtOH use disorder who presented to the ED accompanied by her daughter for evaluation of depression.  States she has been using her antidepressants regularly but is increasingly feeling depressed.  She admits to drinking alcohol daily and her last drink was last night.  Denies past history of EtOH withdrawal syndrome.  Denies history of EtOH related seizures.  Patient according to her daughter would start feeling shaky if she does not drink alcohol.  She does not have a recent follow-up with her psychiatrist.  Denies any suicidal ideations.  Patient also denies dizziness, lightheadedness, shortness of breath, chest pain.  Denied dysuria.  However her urinalysis showed evidence of UTI.  She is afebrile without significant leukocytosis.  In the ED patient is noted to have a sodium of 126 elevated liver enzymes and lipase.  Serum  creatinine level within normal limits.  She is awake alert oriented x3 without signs of anxiety or agitation.  She was seen by mental health while in the ED.  Hospitalist service requested to admit patient for medical management.  Once medically stable patient will be discharged to inpatient psych for treatment of her depressive disorders.  In the ED she did receive a liter of normal saline IV bolus.  She also received Zofran 8 mg IV x1 and an additional 4 mg for nausea and dry heaving.  She did receive diazepam 10 mg x 1         Past Medical History:   Alcohol use disorder  Major depressive disorder  Chronic hyponatremia((per patient and daughter)         Past Surgical History:   Bilateral knee arthroplasties  Lower back surgeries/fusion         Social History:   Denies drugs or smoking  EtOH dependence.  Social History     Socioeconomic History     Marital status:      Spouse name: Not on file     Number of children: Not on file     Years of education: Not on file     Highest education level: Not on file   Occupational History     Not on file   Tobacco Use     Smoking status: Not on file     Smokeless tobacco: Not on file   Substance and Sexual Activity     Alcohol use: Not on file     Drug use: Not on file     Sexual activity: Not on file   Other Topics Concern     Not on file   Social History Narrative     Not on file     Social Determinants of Health     Financial Resource Strain: Not on file   Food Insecurity: Not on file   Transportation Needs: Not on file   Physical Activity: Not on file   Stress: Not on file   Social Connections: Not on file   Intimate Partner Violence: Not on file   Housing Stability: Not on file            Family History:   Family history of EtOH use disorder.  Both parents have history of alcoholism.         Immunizations:     There is no immunization history on file for this patient.           Allergies:    No Known Allergies         Medications:   Patient is on furosemide,  fluoxetine.  Bupropion and potassium supplements.  Home meds reviewed and reconciled.         Review of Systems:   A complete ROS was performed and is negative other than what is stated in the HPI.          Physical Exam:   BP (!) 156/70   Pulse 88   Temp 97.4  F (36.3  C) (Oral)   Resp 16   SpO2 94%      GENERAL: Alert and oriented x 3. NAD.   HEENT: Anicteric sclera. Mucous membranes moist.   CV: RRR. S1, S2. No murmurs appreciated.   RESPIRATORY: Effort normal  Lungs CTAB with no wheezing, rales, rhonchi.   GI: Abdomen soft and non distended with normoactive bowel sounds present in all quadrants. No tenderness, rebound, guarding.   NEUROLOGICAL: No focal deficits. Moves all extremities.    EXTREMITIES: No peripheral edema. Intact bilateral pedal pulses.   SKIN: No jaundice. No rashes.          Data:     Lab Results   Component Value Date    WBC 6.3 06/28/2022     Lab Results   Component Value Date    RBC 3.33 06/28/2022     Lab Results   Component Value Date    HGB 11.8 06/28/2022     Lab Results   Component Value Date    HCT 32.4 06/28/2022     No components found for: MCT  Lab Results   Component Value Date    MCV 97 06/28/2022     Lab Results   Component Value Date    MCH 35.4 06/28/2022     Lab Results   Component Value Date    MCHC 36.4 06/28/2022     Lab Results   Component Value Date    RDW 14.2 06/28/2022     Lab Results   Component Value Date    PLT 92 06/28/2022     Last Comprehensive Metabolic Panel:  Sodium   Date Value Ref Range Status   06/28/2022 126 (L) 133 - 144 mmol/L Final     Potassium   Date Value Ref Range Status   06/28/2022 3.9 3.4 - 5.3 mmol/L Final     Chloride   Date Value Ref Range Status   06/28/2022 92 (L) 94 - 109 mmol/L Final     Carbon Dioxide (CO2)   Date Value Ref Range Status   06/28/2022 21 20 - 32 mmol/L Final     Anion Gap   Date Value Ref Range Status   06/28/2022 13 3 - 14 mmol/L Final     Glucose   Date Value Ref Range Status   06/28/2022 97 70 - 99 mg/dL Final      Urea Nitrogen   Date Value Ref Range Status   06/28/2022 8 7 - 30 mg/dL Final     Creatinine   Date Value Ref Range Status   06/28/2022 0.64 0.52 - 1.04 mg/dL Final     GFR Estimate   Date Value Ref Range Status   06/28/2022 >90 >60 mL/min/1.73m2 Final     Comment:     Effective December 21, 2021 eGFRcr in adults is calculated using the 2021 CKD-EPI creatinine equation which includes age and gender (Hebert et al., NE, DOI: 10.1056/WYQEhb7099003)     Calcium   Date Value Ref Range Status   06/28/2022 8.2 (L) 8.5 - 10.1 mg/dL Final     Bilirubin Total   Date Value Ref Range Status   06/28/2022 1.0 0.2 - 1.3 mg/dL Final     Alkaline Phosphatase   Date Value Ref Range Status   06/28/2022 174 (H) 40 - 150 U/L Final     ALT   Date Value Ref Range Status   06/28/2022 164 (H) 0 - 50 U/L Final     AST   Date Value Ref Range Status   06/28/2022 254 (H) 0 - 45 U/L Final             Color Urine (no units)   Date Value   06/28/2022 Yellow     Appearance Urine (no units)   Date Value   06/28/2022 Slightly Cloudy (A)     Glucose Urine (mg/dL)   Date Value   06/28/2022 Negative     Bilirubin Urine (no units)   Date Value   06/28/2022 Negative     Ketones Urine (mg/dL)   Date Value   06/28/2022 20  (A)     Specific Gravity Urine (no units)   Date Value   06/28/2022 1.019     pH Urine (no units)   Date Value   06/28/2022 5.5     Protein Albumin Urine (mg/dL)   Date Value   06/28/2022 30  (A)     Nitrite Urine (no units)   Date Value   06/28/2022 Positive (A)     Leukocyte Esterase Urine (no units)   Date Value   06/28/2022 Large (A)

## 2022-06-29 NOTE — UTILIZATION REVIEW
Admission Status; Secondary Review Determination       Under the authority of the Utilization Management Committee, the utilization review process indicated a secondary review on the above patient. The review outcome is based on review of the medical records, discussions with staff, and applying clinical experience noted on the date of the review.     (x) Inpatient Status Appropriate - This patient's medical care is consistent with medical management for inpatient care and reasonable inpatient medical practice.     RATIONALE FOR DETERMINATION   73 yo female with alcohol use disorder, depression, chronic pain, hypertension who presented with worsening of depressive symptoms.  Was found to have several electrolyte abnormalities including sodium of 126 and elevated liver enzymes and lipase consistent with acute alcoholic hepatitis.  She has been getting continuous IV fluid resuscitation.  This is led to some improvement in her sodium.  However, her creatinine has significantly worsened so this will need further investigation.  Has been seen and assessed by inpatient psychiatry.  Will transfer to an inpatient psychiatry unit once she is medically stabilized.    At the time of admission with the information available to the attending physician more than 2 nights hospital complex care was anticipated, based on patient risk of adverse outcome if treated as outpatient and complex care required. Inpatient admission is appropriate based on the Medicare guidelines.     This document was produced using voice recognition software.    The information on this document is developed by the utilization review team in order for the business office to ensure compliance. This only denotes the appropriateness of proper admission status and does not reflect the quality of care rendered.   The definitions of Inpatient Status and Observation Status used in making the determination above are those provided in the CMS Coverage Manual, Chapter  1 and Chapter 6, section 70.4.     Sincerely,   Jessica Dallas MD  Utilization Review  Physician Advisor  [facility].

## 2022-06-29 NOTE — PLAN OF CARE
VS: BP (!) 154/65 (BP Location: Right arm)   Pulse 87   Temp 97.7  F (36.5  C) (Oral)   Resp 16   SpO2 97%     O2: Sating >90% on RA. Lung sounds clear. Denies chest pain and SOB.   Output: Voids spontaneously and adequately in bathroom.    Last BM: Pt is unsure. Bowel sounds active x4. Passing flatus.    Activity: Up with 1 assist, FWW, and gait belt.    Up for meals? N/a    Skin: One scab on each inner thigh.    Pain: Denies pain.    CMS: A&Ox4. Denies N/T.    Dressing: PIV dressing to R AC C/D/I.   Diet: Regular with 2000 ml fluid restriction. Appetite was poor. Pt had nausea and emesis x1 only when getting OOB.   LDA: PIV to R AC is infusing at 100 mL/hr.     Equipment: IV pole, FWW, gait belt, and personal belongings.    Plan: Continue to monitor. Call light within reach and utilized appropriately.    Additional Info: Magnesium was 0.8. MD notified immediately.   CIWA score was 13, valium given per protocol.   US needs to be completed in the morning.

## 2022-06-29 NOTE — PLAN OF CARE
"/59 (BP Location: Left arm)   Pulse 87   Temp 97.7  F (36.5  C) (Oral)   Resp 16   Ht 1.626 m (5' 4\")   Wt 108.1 kg (238 lb 6.4 oz)   SpO2 98%   BMI 40.92 kg/m      VSS. Pt denies SOB or chest pain. Pt o2 drops to 88% when sleeping, NC 1L and continues pulse ox maintained, SPO2 >90's. Alert and orineted x 4, denies N/T. Pt denies pain. Pt endorses nausea but had no emesis. Prn IV Zofran given. Voids adequately, LBM: 6/28 per pt, BS+. Up with assist of 1, walker&GB. Regular diet with 2000 ml fluid restriction. CIWA score 7, no valium given. R PIV infusing magnesium. L PIV infusing banana bag. Call light within reach. Continue POC    Critical lab value megnesium 0.8, started on IV magnesium.   "

## 2022-06-29 NOTE — CONSULTS
6/29/2022  I spoke with pt today.  She is not interested in pursuing ADI treatment at this time. She reports her mental health is primary and she drinks because she is depressed. She would like her medications to be adjusted for her depression. She might be interested in pursuing a mental health program. The unit  was informed about pt being possibly interested in mental health programming.    Lauren Brown MA [facility]  [phone]

## 2022-06-30 LAB
ALBUMIN SERPL-MCNC: 2.3 G/DL (ref 3.4–5)
ALP SERPL-CCNC: 150 U/L (ref 40–150)
ALT SERPL W P-5'-P-CCNC: 119 U/L (ref 0–50)
ANION GAP SERPL CALCULATED.3IONS-SCNC: 9 MMOL/L (ref 3–14)
AST SERPL W P-5'-P-CCNC: 155 U/L (ref 0–45)
BACTERIA UR CULT: ABNORMAL
BILIRUB SERPL-MCNC: 0.8 MG/DL (ref 0.2–1.3)
BUN SERPL-MCNC: 10 MG/DL (ref 7–30)
C DIFF TOX B STL QL: NEGATIVE
CALCIUM SERPL-MCNC: 7.7 MG/DL (ref 8.5–10.1)
CHLORIDE BLD-SCNC: 100 MMOL/L (ref 94–109)
CO2 SERPL-SCNC: 23 MMOL/L (ref 20–32)
CREAT SERPL-MCNC: 1.54 MG/DL (ref 0.52–1.04)
ERYTHROCYTE [DISTWIDTH] IN BLOOD BY AUTOMATED COUNT: 15.5 % (ref 10–15)
GFR SERPL CREATININE-BSD FRML MDRD: 35 ML/MIN/1.73M2
GLUCOSE BLD-MCNC: 116 MG/DL (ref 70–99)
HCT VFR BLD AUTO: 28.8 % (ref 35–47)
HGB BLD-MCNC: 9.9 G/DL (ref 11.7–15.7)
HOLD SPECIMEN: NORMAL
LIPASE SERPL-CCNC: 642 U/L (ref 73–393)
MAGNESIUM SERPL-MCNC: 2.7 MG/DL (ref 1.6–2.3)
MCH RBC QN AUTO: 35.5 PG (ref 26.5–33)
MCHC RBC AUTO-ENTMCNC: 34.4 G/DL (ref 31.5–36.5)
MCV RBC AUTO: 103 FL (ref 78–100)
OSMOLALITY UR: 268 MMOL/KG (ref 100–1200)
PHOSPHATE SERPL-MCNC: 2.4 MG/DL (ref 2.5–4.5)
PHOSPHATE SERPL-MCNC: 2.5 MG/DL (ref 2.5–4.5)
PLATELET # BLD AUTO: 105 10E3/UL (ref 150–450)
POTASSIUM BLD-SCNC: 3.9 MMOL/L (ref 3.4–5.3)
PROT SERPL-MCNC: 6 G/DL (ref 6.8–8.8)
RBC # BLD AUTO: 2.79 10E6/UL (ref 3.8–5.2)
SODIUM SERPL-SCNC: 132 MMOL/L (ref 133–144)
WBC # BLD AUTO: 5.2 10E3/UL (ref 4–11)

## 2022-06-30 PROCEDURE — 250N000013 HC RX MED GY IP 250 OP 250 PS 637: Performed by: INTERNAL MEDICINE

## 2022-06-30 PROCEDURE — 83735 ASSAY OF MAGNESIUM: CPT | Performed by: INTERNAL MEDICINE

## 2022-06-30 PROCEDURE — 94640 AIRWAY INHALATION TREATMENT: CPT

## 2022-06-30 PROCEDURE — 120N000002 HC R&B MED SURG/OB UMMC

## 2022-06-30 PROCEDURE — 87493 C DIFF AMPLIFIED PROBE: CPT | Performed by: INTERNAL MEDICINE

## 2022-06-30 PROCEDURE — 99233 SBSQ HOSP IP/OBS HIGH 50: CPT | Performed by: INTERNAL MEDICINE

## 2022-06-30 PROCEDURE — 83690 ASSAY OF LIPASE: CPT | Performed by: INTERNAL MEDICINE

## 2022-06-30 PROCEDURE — 999N000157 HC STATISTIC RCP TIME EA 10 MIN

## 2022-06-30 PROCEDURE — 250N000013 HC RX MED GY IP 250 OP 250 PS 637: Performed by: EMERGENCY MEDICINE

## 2022-06-30 PROCEDURE — 36415 COLL VENOUS BLD VENIPUNCTURE: CPT | Performed by: INTERNAL MEDICINE

## 2022-06-30 PROCEDURE — 258N000003 HC RX IP 258 OP 636: Performed by: INTERNAL MEDICINE

## 2022-06-30 PROCEDURE — 84100 ASSAY OF PHOSPHORUS: CPT | Performed by: INTERNAL MEDICINE

## 2022-06-30 PROCEDURE — 80053 COMPREHEN METABOLIC PANEL: CPT | Performed by: INTERNAL MEDICINE

## 2022-06-30 PROCEDURE — 85027 COMPLETE CBC AUTOMATED: CPT | Performed by: INTERNAL MEDICINE

## 2022-06-30 PROCEDURE — 87506 IADNA-DNA/RNA PROBE TQ 6-11: CPT | Performed by: INTERNAL MEDICINE

## 2022-06-30 PROCEDURE — 94640 AIRWAY INHALATION TREATMENT: CPT | Mod: 76

## 2022-06-30 PROCEDURE — 83935 ASSAY OF URINE OSMOLALITY: CPT | Performed by: INTERNAL MEDICINE

## 2022-06-30 PROCEDURE — 250N000009 HC RX 250: Performed by: INTERNAL MEDICINE

## 2022-06-30 PROCEDURE — 250N000013 HC RX MED GY IP 250 OP 250 PS 637: Performed by: PSYCHIATRY & NEUROLOGY

## 2022-06-30 RX ORDER — HYDRALAZINE HYDROCHLORIDE 20 MG/ML
10 INJECTION INTRAMUSCULAR; INTRAVENOUS EVERY 4 HOURS PRN
Status: DISCONTINUED | OUTPATIENT
Start: 2022-06-30 | End: 2022-07-07 | Stop reason: HOSPADM

## 2022-06-30 RX ORDER — IPRATROPIUM BROMIDE AND ALBUTEROL SULFATE 2.5; .5 MG/3ML; MG/3ML
3 SOLUTION RESPIRATORY (INHALATION)
Status: DISCONTINUED | OUTPATIENT
Start: 2022-06-30 | End: 2022-07-03

## 2022-06-30 RX ADMIN — LOSARTAN POTASSIUM 100 MG: 100 TABLET, FILM COATED ORAL at 08:31

## 2022-06-30 RX ADMIN — CLONIDINE HYDROCHLORIDE 0.1 MG: 0.1 TABLET ORAL at 11:29

## 2022-06-30 RX ADMIN — IPRATROPIUM BROMIDE AND ALBUTEROL SULFATE 3 ML: 2.5; .5 SOLUTION RESPIRATORY (INHALATION) at 11:42

## 2022-06-30 RX ADMIN — CLONIDINE HYDROCHLORIDE 0.1 MG: 0.1 TABLET ORAL at 03:29

## 2022-06-30 RX ADMIN — POTASSIUM & SODIUM PHOSPHATES POWDER PACK 280-160-250 MG 1 PACKET: 280-160-250 PACK at 08:31

## 2022-06-30 RX ADMIN — PANTOPRAZOLE SODIUM 40 MG: 40 TABLET, DELAYED RELEASE ORAL at 08:31

## 2022-06-30 RX ADMIN — THIAMINE HCL TAB 100 MG 100 MG: 100 TAB at 08:31

## 2022-06-30 RX ADMIN — IPRATROPIUM BROMIDE AND ALBUTEROL SULFATE 3 ML: 2.5; .5 SOLUTION RESPIRATORY (INHALATION) at 20:47

## 2022-06-30 RX ADMIN — FOLIC ACID 1 MG: 1 TABLET ORAL at 08:31

## 2022-06-30 RX ADMIN — SODIUM CHLORIDE: 9 INJECTION, SOLUTION INTRAVENOUS at 04:13

## 2022-06-30 RX ADMIN — GABAPENTIN 900 MG: 300 CAPSULE ORAL at 03:29

## 2022-06-30 RX ADMIN — IPRATROPIUM BROMIDE AND ALBUTEROL SULFATE 3 ML: 2.5; .5 SOLUTION RESPIRATORY (INHALATION) at 16:59

## 2022-06-30 RX ADMIN — MULTIPLE VITAMINS W/ MINERALS TAB 1 TABLET: TAB at 08:31

## 2022-06-30 RX ADMIN — POTASSIUM & SODIUM PHOSPHATES POWDER PACK 280-160-250 MG 1 PACKET: 280-160-250 PACK at 03:29

## 2022-06-30 RX ADMIN — DULOXETINE HYDROCHLORIDE 30 MG: 30 CAPSULE, DELAYED RELEASE ORAL at 08:31

## 2022-06-30 RX ADMIN — POTASSIUM & SODIUM PHOSPHATES POWDER PACK 280-160-250 MG 1 PACKET: 280-160-250 PACK at 11:30

## 2022-06-30 ASSESSMENT — ACTIVITIES OF DAILY LIVING (ADL)
ADLS_ACUITY_SCORE: 32

## 2022-06-30 NOTE — PLAN OF CARE
"/54 (BP Location: Right arm)   Pulse 72   Temp 98.1  F (36.7  C) (Oral)   Resp 18   Ht 1.626 m (5' 4\")   Wt 108.1 kg (238 lb 6.4 oz)   SpO2 92%   BMI 40.92 kg/m    Pt is alert and oriented x4. Wears 2L O2@ HS.  Denies SOB/CP.  CIWA score:2.  Noticeably depressed affect and mood during conversations w/RN.  Denies SI.  Oral Phos replacement this shift. Denies pain. No distress noted. Assist x 1 with mobility/transfers.  Appears to be resting comfortably at this time.  Call light within reach. Continue POC/                               "

## 2022-06-30 NOTE — PROGRESS NOTES
[facility]    Medicine Progress Note - Hospitalist Service, GOLD TEAM 16    Date of Admission:  6/28/2022    Assessment & Plan        Juani Hernandez is a 71 yo female w/ h/o depression, alcohol abuse, HTN, HLD, GERD, osteopenia, spondylolisthesis of the lumbar region with prior lumbar fusion, DJD and chronic pain.  She states she drinks 2 drinks of whiskey daily.  She says she drinks whiskey daily so she does not exhibit withdrawal symptoms.  History is negative for alcohol withdrawal seizures or DTs.  She was brought to [facility] ED by her daughter for evaluation of increased depression, poor appetite, decreased oral intake.  History is negative for suicidal or homicidal ideation.  Evaluation in the ED on 6/28/22 revealed BAL 0.08 g/dl, Na 126 and elevated transaminase.  Admitted to hospital service for detoxification and treatment of hyponatremia.      Alcohol abuse  Alcohol intoxication  ---   BAL was 0.08 g/dL  ---   Urine tox screen negative  ---   Pt only received a dose of Valium since admit due to lack of withdrawal symptoms  ---   Continue Valium per alcohol withdrawal treatment protocol using CIWA score  ---   D/c gabapentin taper since pt has not exhibited any withdrawal symptoms  ---   Continue clonidine for adrenergic hyperactivity symptoms  ---   Continue MIVF, MVI, folate and thiamine supplements    Severe depression  ---   Has flat affect and depressed mood  ---   She says her PTA meds do not help  ---   Seen by psychiatry consult service yesterday who discontinued patient's PTA fluoxetine and bupropion  ---   She was initiated on Cymbalta 30 mg daily with plan to titrate up  ---   Transfer to inpatient psychiatry unit, preferably station 3B when bed become avalable    Hypoxia  ---   Pt was placed on 2 L NC O2 supplement during sleep last night  ---   On RA but she was wheezing on exam this morning  ---   Hypoxia was probably due to undiagnosed  BALBINA/hypoventilation  ---   Start DuoNeb as needed  ---   Monitor    MERON  ---   Suspect prerenal  ---   Admit creatinine was 0.8 ---> IVF ---> 1.12 ---> IVF ---> 1.54  ---   Hold PTA losartan  ---   Continue IVF hydration  ---   Avoid nephrotoxins    Alcoholic hepatitis  ---   AST/ALT ratio c/w alcoholic liver disease  ---   Abdominal ultrasound earlier this morning revealed steatosis  ---   Abstinence from alcohol strongly recommended    Severe hyponatremia  ---   Pt drinks whiskey and not beer thus less likely due to beer potomania  ---   Na level was 126 admit  ---   Serum osmolality 273 (low)  ---   Urine sodium is 37  ---   Urine osmolality 268 (low)  ---   Hyponatremia was probably due to volume depletion  ---   Na is up to 132 this am, 48 hours after admission  ---   Continue maintenance IV fluid    HTN  ---   Well-controlled  ---   Hold PTA losartan  ---   Continue holding PTA Lasix since patient is receiving IVF hydration  ---   Start as needed IV hydralazine    HLD  ---   PTA simvastatin on hold for transaminitis  ---   Continue holding for another day    Hypophosphatemia  ---   On replacement protocol    Elevated lipase at 606 ---> 206  ---   Denied epigastric pain radiating to her back  ---   Denied nausea or vomiting this morning  ---   No evidence of acute pancreatitis   ---   Lipase is back to normal range    Thrombocytopenia  ---   Platelet count is 92k ---> 105k  ---   Due to alcohol affecting bone marrow and possibly platelet sequestration in the spleen  ---   No evidence of bleeding  ---   Abstinence from alcohol strongly recommended    Severe obesity with BMI of 40.92 kg/m2  ---   Weight loss strongly recommended    Hypophosphatemia  ---   On replacement protocol    Generalized weakness  ---   PT consulted    Diarrhea  ---   Onset prior to admission  ---   Check a stool C. difficile toxins and enteric pathogens    Universal COVID-19 screening  ---   Fully vaccinated but not boosted  ---    SARS-CoV-2 PCR negative on 6/28/2022          Diet: Fluid restriction 2000 ML FLUID  Regular Diet Adult    DVT Prophylaxis: Pneumatic Compression Devices  Will Catheter: Not present  Central Lines: None  Cardiac Monitoring: None  Code Status: Full Code      Disposition Plan   Anticipate transfer to close mental health unit for treatment of severe depression in 1 to 2 days.      The patient's care was discussed with patient's daughter who was at bedside        Jannet Calhoun MD  Hospitalist Service, GOLD TEAM [address] [place] [address]  Securely message with the Vocera Web Console (learn more here)  Text page via [facility] Paging/Directory   Please see signed in provider for up to date coverage information      ______________________________________________________________________    Interval History   No complaints.  She feels very weak.  She was on 2 L NC O2 supplement on arrival in her room.  She denies CP or SOB.  He has been on oxygen since last night.  I remove the oxygen supplement and instead in her room for about 15 minutes.  Patient was able to maintain O2 sat of 93 to 96% on RA during my evaluation.    Data reviewed today: I reviewed all medications, new labs and imaging results over the last 24 hours.     Physical Exam   Vital Signs: Temp: 98.1  F (36.7  C) Temp src: Oral BP: 118/63 Pulse: 78   Resp: 16 SpO2: 98 % O2 Device: None (Room air) Oxygen Delivery: 2 LPM  Weight: 238 lbs 6.4 oz  General: Morbidly obese, aao x 3, NAD.  HEENT:  NC/AT, PERRL, EOMI, neck supple, no thyromegaly, op clear, mmm.  CVS:  NL s 1 and s2, no m/r/g.  Lungs:  CTA B/L.   Abd:  Soft, + bs, NT, no rebound or gaurding, no fluid shift.  Ext:  No c/c.  Lymph:  No edema.  Neuro:  Nonfocal.  Musculoskeletal: No calf tenderness to palpation.    Skin:  No rash.  Psychiatry:  Flat affect and depressed mood      Data   Recent Labs   Lab 06/30/22  0554 06/29/22 2002 06/29/22  1119 06/28/22  1608   WBC 5.2   --   --  6.3   HGB 9.9*  --   --  11.8   *  --   --  97   *  --   --  92*   *  --  131* 126*   POTASSIUM 3.9  --  4.1 3.9   CHLORIDE 100  --  97 92*   CO2 23  --  26 21   BUN 10  --  9 8   CR 1.54*  --  1.12* 0.64   ANIONGAP 9  --  8 13   YOSEPH 7.7*  --  8.1* 8.2*   * 135* 130* 97   ALBUMIN 2.3*  --  2.5* 2.8*   PROTTOTAL 6.0*  --  6.3* 7.2   BILITOTAL 0.8  --  0.9 1.0   ALKPHOS 150  --  154* 174*   *  --  133* 164*   *  --  183* 254*   LIPASE 642*  --   --  606*     No results found for this or any previous visit (from the past 24 hour(s)).  Medications     sodium chloride 125 mL/hr at 06/30/22 0413       cloNIDine  0.1 mg Oral Q8H     DULoxetine  30 mg Oral Daily     folic acid  1 mg Oral Daily     ipratropium - albuterol 0.5 mg/2.5 mg/3 mL  3 mL Nebulization 4x daily     multivitamin w/minerals  1 tablet Oral Daily     pantoprazole  40 mg Oral QAM AC     sodium chloride   Intravenous Once     thiamine  100 mg Oral Daily

## 2022-06-30 NOTE — CONSULTS
"Consult Date: 06/29/2022    Mrs. Hernandez is a 72-year-old white, , mother of 2 with about 40-year history of depression and alcohol abuse, who was brought to the hospital by her daughter because of increasing symptoms of depression, lack of appetite, and continued alcohol abuse.  The patient also has been suffering a lot of pain associated with her polyarthritis.  Although she denied suicidal thoughts, she was quoted as saying that \"I would like to just go to sleep.\" The patient was transferred to the medical floor on [facility] protocol with Valium and both chemical dependency consult and psychiatric consult were ordered.  It should be noted that the patient was seen by CHLOE Ayala, for a chemical dependency evaluation.  Reportedly, the patient expressed no interest in a chemical dependency treatment.    HISTORY OF PRESENT ILLNESS:  I have reviewed the patient's chart and interviewed the patient in her room.  She engaged in interview without any difficulties and provided coherent and seemingly reliable history.  She told me that she had no mental health problems up until 2008 when she developed depression shortly after her spinal surgery and was put on both Wellbutrin-XL and Prozac while in the hospital.  She continued to take these medications as prescribed, but noticed only partial improvement, as she continued to be depressed, hopeless with decreased energy, and lack of appetite.  Her night sleep remained adequate and she slept about 8 hours per night.  According to her daughter, she has not been eating at all within several days prior to admission.    The patient has never been evaluated by a psychiatrist or a therapist.  Psychotropic medications were prescribed to her by her primary care physician.    The patient has never experienced any active suicidal thoughts, never had any psychotic manifestations, and has been taking her medications as prescribed.    CHEMICAL USE HISTORY:  The patient " was a social drinker up until about 10 years ago, when after jail, she started to drink on a regular basis, consuming at least 2 whiskey soda drinks a day on a daily basis.  She had never experienced any withdrawal seizures or DTs, but has been quite shaky when she tried not to drink.  She had never been evaluated by a chemical dependency counselor and never had any formal chemical dependency treatments.    The patient adamantly denied any illicit drug use, but reportedly has been abusing her pain medications.    FAMILY HISTORY:  Remarkable for depression in her sister and in one of her daughters, who also has been suffering from eating disorder.    PAST MEDICAL HISTORY:  Notable for a long history of polyarthritis and hyponatremia.  She does have a history of laminectomy and, after the surgery, has been doing well for a while, but in early 2018, started to experience more pain and difficulties ambulating.  She did undergo post-laminectomy L4-L5 reconstructive surgery with posterior spinal fusion.  The patient also has bilateral knee arthroplasties.    MEDICATIONS:  Prior to admission included Wellbutrin- mg q.a.m. (this was held since admission) and Prozac 20 mg q.a.m.  As it was mentioned above, she has been taking her medications as prescribed with no or minimal results.    MENTAL STATUS EXAMINATION:  Revealed a normally built and normally developed, obese, generally pleasant, friendly, and cooperative with the interview, 72-year-old white female, appearing about her stated age.  She was alert and oriented x 3.  Her speech was coherent, logical, and goal-directed.  She appeared to be somewhat depressed without noticeable psychomotor retardation.  She adamantly denied suicidal ideation or intent.  She denied hallucinations and no prominent delusions could be noted or elicited.  She showed no signs of alcohol withdrawal, except for minor hand tremor.  She had some insight into her problems and was  actively seeking help.  Her judgment did not seem to be significantly impaired.    DIAGNOSTIC IMPRESSION:   1.  Depression, not otherwise specified.  2.  Alcohol use disorder.  3.  Chronic pain syndrome.    PLAN:  I would continue CIWA protocol with Valium.  I will discontinue both Prozac and Wellbutrin, as ineffective, and give her a trial with Cymbalta 30 mg q.a.m. to start with.  The dose should further be increased to 60 and even to 90 mg, depending on her tolerance and clinical response.  The patient should be transferred to inpatient psychiatry, preferably station 3B when she is medically stable.    I thank you very much for letting me participate in the care of this patient.    Juan Carlos Moreno MD        D: 2022   T: 2022   MT: EARLINE    Name:     BRUNO HILLUvaldo  MRN:      [id number]        Account:      [id number]   :      1950           Consult Date: 2022     Document: [id number]

## 2022-06-30 NOTE — CONSULTS
PSYCHIATRIC CONSULTATION    Admission Date: 06/28/2022  Date of Service: 06/29/2022    The patient was seen, her chart reviewed, report to follow.    Dx: Depression NOS         Alcohol Use Disorder         Chronic Pain Syndrome    PLAN: Continue CIWA protocol with Valium. I will discontinue both Prozac and Wellbutrin as ineffective and give her a trial with Cymbalta 30 mg QAM to start with. Transfer to Inpatient Psychiatry, preferably Station 3B when she is medically cleared.    Thanks,    Juan Carlos Moreno MD

## 2022-06-30 NOTE — PLAN OF CARE
Patient is alert and oriented per baseline, and able to communicate needs. VSS. Patient denies pain at this time. Assist of x1 with walker. Patient had diarrhea x2 in the morning. Provider is aware, and waiting for stool sample to be collected. Stayed in bed resting. Will continue with current POC.

## 2022-07-01 ENCOUNTER — APPOINTMENT (OUTPATIENT)
Dept: PHYSICAL THERAPY | Facility: CLINIC | Age: 72
DRG: 885 | End: 2022-07-01
Attending: INTERNAL MEDICINE
Payer: MEDICARE

## 2022-07-01 ENCOUNTER — TELEPHONE (OUTPATIENT)
Dept: BEHAVIORAL HEALTH | Facility: CLINIC | Age: 72
End: 2022-07-01

## 2022-07-01 LAB
ANION GAP SERPL CALCULATED.3IONS-SCNC: 9 MMOL/L (ref 3–14)
BUN SERPL-MCNC: 11 MG/DL (ref 7–30)
C COLI+JEJUNI+LARI FUSA STL QL NAA+PROBE: NOT DETECTED
CALCIUM SERPL-MCNC: 7.4 MG/DL (ref 8.5–10.1)
CHLORIDE BLD-SCNC: 101 MMOL/L (ref 94–109)
CO2 SERPL-SCNC: 22 MMOL/L (ref 20–32)
CREAT SERPL-MCNC: 1.51 MG/DL (ref 0.52–1.04)
EC STX1 GENE STL QL NAA+PROBE: NOT DETECTED
EC STX2 GENE STL QL NAA+PROBE: NOT DETECTED
GFR SERPL CREATININE-BSD FRML MDRD: 36 ML/MIN/1.73M2
GLUCOSE BLD-MCNC: 108 MG/DL (ref 70–99)
MAGNESIUM SERPL-MCNC: 2.2 MG/DL (ref 1.6–2.3)
NOROV GI+II ORF1-ORF2 JNC STL QL NAA+PR: NOT DETECTED
PHOSPHATE SERPL-MCNC: 3.1 MG/DL (ref 2.5–4.5)
POTASSIUM BLD-SCNC: 3.5 MMOL/L (ref 3.4–5.3)
RVA NSP5 STL QL NAA+PROBE: NOT DETECTED
SALMONELLA SP RPOD STL QL NAA+PROBE: NOT DETECTED
SHIGELLA SP+EIEC IPAH STL QL NAA+PROBE: NOT DETECTED
SODIUM SERPL-SCNC: 132 MMOL/L (ref 133–144)
V CHOL+PARA RFBL+TRKH+TNAA STL QL NAA+PR: NOT DETECTED
Y ENTERO RECN STL QL NAA+PROBE: NOT DETECTED

## 2022-07-01 PROCEDURE — 250N000013 HC RX MED GY IP 250 OP 250 PS 637: Performed by: EMERGENCY MEDICINE

## 2022-07-01 PROCEDURE — 36415 COLL VENOUS BLD VENIPUNCTURE: CPT | Performed by: INTERNAL MEDICINE

## 2022-07-01 PROCEDURE — 84100 ASSAY OF PHOSPHORUS: CPT | Performed by: INTERNAL MEDICINE

## 2022-07-01 PROCEDURE — 250N000013 HC RX MED GY IP 250 OP 250 PS 637: Performed by: INTERNAL MEDICINE

## 2022-07-01 PROCEDURE — 80048 BASIC METABOLIC PNL TOTAL CA: CPT | Performed by: INTERNAL MEDICINE

## 2022-07-01 PROCEDURE — 250N000013 HC RX MED GY IP 250 OP 250 PS 637: Performed by: PSYCHIATRY & NEUROLOGY

## 2022-07-01 PROCEDURE — 258N000003 HC RX IP 258 OP 636: Performed by: INTERNAL MEDICINE

## 2022-07-01 PROCEDURE — 999N000157 HC STATISTIC RCP TIME EA 10 MIN

## 2022-07-01 PROCEDURE — 94640 AIRWAY INHALATION TREATMENT: CPT | Mod: 76

## 2022-07-01 PROCEDURE — 97161 PT EVAL LOW COMPLEX 20 MIN: CPT | Mod: GP

## 2022-07-01 PROCEDURE — 97110 THERAPEUTIC EXERCISES: CPT | Mod: GP

## 2022-07-01 PROCEDURE — 120N000002 HC R&B MED SURG/OB UMMC

## 2022-07-01 PROCEDURE — 83735 ASSAY OF MAGNESIUM: CPT | Performed by: INTERNAL MEDICINE

## 2022-07-01 PROCEDURE — 94640 AIRWAY INHALATION TREATMENT: CPT

## 2022-07-01 PROCEDURE — 99232 SBSQ HOSP IP/OBS MODERATE 35: CPT | Performed by: INTERNAL MEDICINE

## 2022-07-01 PROCEDURE — 97530 THERAPEUTIC ACTIVITIES: CPT | Mod: GP

## 2022-07-01 PROCEDURE — 250N000009 HC RX 250: Performed by: INTERNAL MEDICINE

## 2022-07-01 RX ADMIN — IPRATROPIUM BROMIDE AND ALBUTEROL SULFATE 3 ML: 2.5; .5 SOLUTION RESPIRATORY (INHALATION) at 16:27

## 2022-07-01 RX ADMIN — CLONIDINE HYDROCHLORIDE 0.1 MG: 0.1 TABLET ORAL at 12:38

## 2022-07-01 RX ADMIN — IPRATROPIUM BROMIDE AND ALBUTEROL SULFATE 3 ML: 2.5; .5 SOLUTION RESPIRATORY (INHALATION) at 12:24

## 2022-07-01 RX ADMIN — DULOXETINE HYDROCHLORIDE 30 MG: 30 CAPSULE, DELAYED RELEASE ORAL at 08:10

## 2022-07-01 RX ADMIN — MULTIPLE VITAMINS W/ MINERALS TAB 1 TABLET: TAB at 08:09

## 2022-07-01 RX ADMIN — PANTOPRAZOLE SODIUM 40 MG: 40 TABLET, DELAYED RELEASE ORAL at 08:10

## 2022-07-01 RX ADMIN — IPRATROPIUM BROMIDE AND ALBUTEROL SULFATE 3 ML: 2.5; .5 SOLUTION RESPIRATORY (INHALATION) at 07:48

## 2022-07-01 RX ADMIN — FOLIC ACID 1 MG: 1 TABLET ORAL at 08:10

## 2022-07-01 RX ADMIN — THIAMINE HCL TAB 100 MG 100 MG: 100 TAB at 08:10

## 2022-07-01 RX ADMIN — IPRATROPIUM BROMIDE AND ALBUTEROL SULFATE 3 ML: 2.5; .5 SOLUTION RESPIRATORY (INHALATION) at 20:54

## 2022-07-01 RX ADMIN — SODIUM CHLORIDE, POTASSIUM CHLORIDE, SODIUM LACTATE AND CALCIUM CHLORIDE 1000 ML: 600; 310; 30; 20 INJECTION, SOLUTION INTRAVENOUS at 10:54

## 2022-07-01 RX ADMIN — CLONIDINE HYDROCHLORIDE 0.1 MG: 0.1 TABLET ORAL at 20:25

## 2022-07-01 ASSESSMENT — ACTIVITIES OF DAILY LIVING (ADL)
ADLS_ACUITY_SCORE: 32

## 2022-07-01 NOTE — PROGRESS NOTES
[facility]    Medicine Progress Note - Hospitalist Service, GOLD TEAM 16    Date of Admission:  6/28/2022    Assessment & Plan        Juani Hernandez is a 73 yo female w/ h/o depression, alcohol abuse, HTN, HLD, GERD, osteopenia, spondylolisthesis of the lumbar region with prior lumbar fusion, DJD and chronic pain.  She states she drinks 2 drinks of whiskey daily.  She says she drinks whiskey daily so she does not exhibit withdrawal symptoms.  History is negative for alcohol withdrawal seizures or DTs.  She was brought to [facility] ED by her daughter for evaluation of increased depression, poor appetite, decreased oral intake.  History is negative for suicidal or homicidal ideation.  Evaluation in the ED on 6/28/22 revealed BAL 0.08 g/dl, Na 126 and elevated transaminase.  Admitted to hospital service for detoxification and treatment of hyponatremia.      Alcohol abuse  Alcohol intoxication  ---   BAL was 0.08 g/dL  ---   Urine tox screen negative  ---   Pt only received a dose of Valium since admit due to lack of withdrawal symptoms  ---   Continue Valium per alcohol withdrawal treatment protocol using CIWA score  ---   D/c'd gabapentin taper on 6/30/22 since pt has not exhibited any withdrawal symptoms  ---   Continue clonidine for adrenergic hyperactivity symptoms  ---   Continue MIVF, MVI, folate and thiamine supplements    Severe depression  ---   Has flat affect and depressed mood  ---   She says her PTA meds do not help  ---   Seen by psychiatry consult service on 6/29/22 who discontinued patient's PTA fluoxetine and bupropion  ---   She was initiated on Cymbalta 30 mg daily on 6/29/22 with plan to titrate up  ---   Transfer to inpatient psychiatry unit, preferably station 3B when bed become avalable    Hypoxia  ---   Pt was placed on 2 L NC O2 supplement during sleep night of 6/29/22  ---   On RA since AM of 6/30/22  ---   Hypoxia was probably due to undiagnosed  BALBINA/hypoventilation  ---   Started on DuoNeb as needed on 6/30/22 for bronchospasm or wheezing  ---   Monitor    MERON  ---   Suspect prerenal  ---   Admit creatinine was 0.8 ---> IVF ---> 1.12 ---> IVF ---> 1.54 --> 1.51  ---   Hold PTA losartan  ---   We will treated with a liter of LR bolus over 4 hours and check kidney function in a.m.  ---   Avoid nephrotoxins    Alcoholic hepatitis  ---   AST/ALT ratio c/w alcoholic liver disease  ---   Abdominal ultrasound earlier this morning revealed steatosis  ---   Abstinence from alcohol strongly recommended    Severe hyponatremia  ---   Pt drinks whiskey and not beer thus less likely due to beer potomania  ---   Na level was 126 admit  ---   Serum osmolality 273 (low)  ---   Urine sodium is 37  ---   Urine osmolality 268 (low)  ---   Hyponatremia was probably due to volume depletion  ---   Na is up to 132 this am, 72 hours after admission  ---   LR 1 L bolus over 4 hours ordered    HTN  ---   Well-controlled  ---   Hold PTA losartan  ---   Continue holding PTA Lasix since pt's creatinine still elevated  ---   Continue as needed IV hydralazine    HLD  ---   PTA simvastatin on hold for transaminitis  ---   Continue holding for another day    Hypophosphatemia  ---   On replacement protocol    Elevated lipase at 606 ---> 206  ---   Denied epigastric pain radiating to her back  ---   Denied nausea or vomiting this morning  ---   No evidence of acute pancreatitis   ---   Lipase is back to normal range    Thrombocytopenia  ---   Platelet count is 92k ---> 105k  ---   Due to alcohol affecting bone marrow and possibly platelet sequestration in the spleen  ---   No evidence of bleeding  ---   Abstinence from alcohol strongly recommended    Severe obesity with BMI of 40.92 kg/m2  ---   Weight loss strongly recommended    Hypophosphatemia  ---   On replacement protocol    Generalized weakness  ---   PT consulted    Diarrhea  ---   Onset prior to admission  ---   Stool C. difficile  toxins and enteric pathogens were negative on 6/30/2022  ---   Start Imodium as needed    Generalized weakness  ---   PT/OT consulted    Universal COVID-19 screening  ---   Fully vaccinated but not boosted  ---   SARS-CoV-2 PCR negative on 6/28/2022          Diet: Fluid restriction 2000 ML FLUID  Regular Diet Adult    DVT Prophylaxis: Pneumatic Compression Devices  Will Catheter: Not present  Central Lines: None  Cardiac Monitoring: None  Code Status: Full Code      Disposition Plan   Anticipate transfer to station 3B when bed become available        Jannet Calhoun MD  Hospitalist Service, GOLD TEAM 16  [facility]  Securely message with the Vocera Web Console (learn more here)  Text page via [facility] Paging/Directory   Please see signed in provider for up to date coverage information      ______________________________________________________________________    Interval History   No complaints.  Uneventful night.  On RA.  She wants to take a shower.  Awaiting for placement to close inpatient mental health unit    Data reviewed today: I reviewed all medications, new labs and imaging results over the last 24 hours.     Physical Exam   Vital Signs: Temp: 98  F (36.7  C) Temp src: Oral BP: (!) 144/76 Pulse: 99   Resp: 18 SpO2: 94 % O2 Device: None (Room air)    Weight: 238 lbs 6.4 oz  General: Morbidly obese, aao x 3, NAD.  HEENT:  NC/AT, PERRL, EOMI, neck supple, no thyromegaly, op clear, mmm.  CVS:  NL s 1 and s2, no m/r/g.  Lungs:  CTA B/L.   Abd:  Soft, + bs, NT, no rebound or gaurding, no fluid shift.  Ext:  No c/c.  Lymph:  No edema.  Neuro:  Nonfocal.  Musculoskeletal: No calf tenderness to palpation.    Skin:  No rash.  Psychiatry:  Flat affect and depressed mood      Data   Recent Labs   Lab 07/01/22  0550 06/30/22  0554 06/29/22  2002 06/29/22  1119 06/28/22  1608   WBC  --  5.2  --   --  6.3   HGB  --  9.9*  --   --  11.8   MCV  --  103*  --   --  97   PLT  --  105*   --   --  92*   * 132*  --  131* 126*   POTASSIUM 3.5 3.9  --  4.1 3.9   CHLORIDE 101 100  --  97 92*   CO2 22 23  --  26 21   BUN 11 10  --  9 8   CR 1.51* 1.54*  --  1.12* 0.64   ANIONGAP 9 9  --  8 13   YOSEPH 7.4* 7.7*  --  8.1* 8.2*   * 116* 135* 130* 97   ALBUMIN  --  2.3*  --  2.5* 2.8*   PROTTOTAL  --  6.0*  --  6.3* 7.2   BILITOTAL  --  0.8  --  0.9 1.0   ALKPHOS  --  150  --  154* 174*   ALT  --  119*  --  133* 164*   AST  --  155*  --  183* 254*   LIPASE  --  642*  --   --  606*     No results found for this or any previous visit (from the past 24 hour(s)).  Medications     sodium chloride 125 mL/hr at 06/30/22 0413       cloNIDine  0.1 mg Oral Q8H     DULoxetine  30 mg Oral Daily     folic acid  1 mg Oral Daily     ipratropium - albuterol 0.5 mg/2.5 mg/3 mL  3 mL Nebulization 4x daily     lactated ringers  1,000 mL Intravenous Once     multivitamin w/minerals  1 tablet Oral Daily     pantoprazole  40 mg Oral QAM AC     sodium chloride   Intravenous Once     thiamine  100 mg Oral Daily

## 2022-07-01 NOTE — TELEPHONE ENCOUNTER
S: 9:30a Michaelle w/ Charge RN 8A called Intake w/ clinical on a 72/F present in the [facility] ER w/ ETOH and Depression.     B:  The pt is currently at the [facility] ER presenting w/ ETOH, Depression. No known Stressors    The pt denies using any substances.     The pt has been not IP for MH before.    The pts MH Hx is as follows: Depression, Substance use.     The pt is Rx MH medications. Medications are listed in Epic. The pt is complaint.     Unknow if pt does have a medication management provider.    Unknown if pt does have a therapist.    There is no concern for aggression this visit. There is no concern for HI.    Per  the pt can ambulate independently, sometimes becomes short of breathe.     Per  the pt is indep with ADLs.    The pt does not have any known medical concerns.    Covid has been collected.  Utox has been collected.    A: Vol     R: The pt is currently in the [facility] ER awaiting bed placement.    Placement preferences: Unknown, [facility] only.
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
## 2022-07-01 NOTE — PLAN OF CARE
"/59 (BP Location: Left arm, Patient Position: Semi-Solorzano's)   Pulse 83   Temp 97.3  F (36.3  C) (Oral)   Resp 18   Ht 1.626 m (5' 4\")   Wt 108.1 kg (238 lb 6.4 oz)   SpO2 96%   BMI 40.92 kg/m      A&Ox4, respirations regular, dyspnea w/exertion, duonebs prn for persistent expiratory wheezing.  O2 sats>90% on RA.  Denies CP, N/V. CIWA score 2. Denies w/d symptoms from alcohol. Pt with baseline tremor, states she has had tremor for a couple of years.  Pt states the primary reason for her hospitalization is to manage her depression. States she is feeling more mentally clear today after medication adjustment.  Denies SI. Pt with multiple loose stools.  Specimen sent to r/o C.diff.  Placed on enteric precautions. Abd soft, NT, BS+x4Q. Up with 1x assist to BSC, prn.  Calls appropriately for assistance as needed. Denies pain. Continue POC.                                   "

## 2022-07-01 NOTE — PROGRESS NOTES
Patient had unwitnessed fall in room at 1410. Patient was assisted back into bed by RN's on floor. Reporting no pain on assessment and assessment of skin in unchanged from prior to fall. Dr. Calhoun updated, no new orders.

## 2022-07-02 ENCOUNTER — APPOINTMENT (OUTPATIENT)
Dept: GENERAL RADIOLOGY | Facility: CLINIC | Age: 72
DRG: 885 | End: 2022-07-02
Attending: INTERNAL MEDICINE
Payer: MEDICARE

## 2022-07-02 LAB
ANION GAP SERPL CALCULATED.3IONS-SCNC: 10 MMOL/L (ref 3–14)
BUN SERPL-MCNC: 11 MG/DL (ref 7–30)
CALCIUM SERPL-MCNC: 8.2 MG/DL (ref 8.5–10.1)
CHLORIDE BLD-SCNC: 102 MMOL/L (ref 94–109)
CO2 SERPL-SCNC: 22 MMOL/L (ref 20–32)
CREAT SERPL-MCNC: 0.9 MG/DL (ref 0.52–1.04)
CREAT SERPL-MCNC: 0.92 MG/DL (ref 0.52–1.04)
GFR SERPL CREATININE-BSD FRML MDRD: 66 ML/MIN/1.73M2
GFR SERPL CREATININE-BSD FRML MDRD: 68 ML/MIN/1.73M2
GLUCOSE BLD-MCNC: 99 MG/DL (ref 70–99)
HOLD SPECIMEN: NORMAL
MAGNESIUM SERPL-MCNC: 1.5 MG/DL (ref 1.6–2.3)
MAGNESIUM SERPL-MCNC: 1.6 MG/DL (ref 1.6–2.3)
MAGNESIUM SERPL-MCNC: 1.8 MG/DL (ref 1.6–2.3)
MAGNESIUM SERPL-MCNC: 2.2 MG/DL (ref 1.6–2.3)
PHOSPHATE SERPL-MCNC: 2.6 MG/DL (ref 2.5–4.5)
PHOSPHATE SERPL-MCNC: 2.6 MG/DL (ref 2.5–4.5)
POTASSIUM BLD-SCNC: 3.6 MMOL/L (ref 3.4–5.3)
SODIUM SERPL-SCNC: 134 MMOL/L (ref 133–144)

## 2022-07-02 PROCEDURE — 250N000012 HC RX MED GY IP 250 OP 636 PS 637: Performed by: INTERNAL MEDICINE

## 2022-07-02 PROCEDURE — 82310 ASSAY OF CALCIUM: CPT | Performed by: INTERNAL MEDICINE

## 2022-07-02 PROCEDURE — 84100 ASSAY OF PHOSPHORUS: CPT | Performed by: INTERNAL MEDICINE

## 2022-07-02 PROCEDURE — 250N000009 HC RX 250: Performed by: INTERNAL MEDICINE

## 2022-07-02 PROCEDURE — 250N000013 HC RX MED GY IP 250 OP 250 PS 637: Performed by: INTERNAL MEDICINE

## 2022-07-02 PROCEDURE — 36415 COLL VENOUS BLD VENIPUNCTURE: CPT | Performed by: INTERNAL MEDICINE

## 2022-07-02 PROCEDURE — 94640 AIRWAY INHALATION TREATMENT: CPT

## 2022-07-02 PROCEDURE — 250N000013 HC RX MED GY IP 250 OP 250 PS 637: Performed by: PSYCHIATRY & NEUROLOGY

## 2022-07-02 PROCEDURE — 120N000002 HC R&B MED SURG/OB UMMC

## 2022-07-02 PROCEDURE — 82565 ASSAY OF CREATININE: CPT | Performed by: INTERNAL MEDICINE

## 2022-07-02 PROCEDURE — 99233 SBSQ HOSP IP/OBS HIGH 50: CPT | Performed by: INTERNAL MEDICINE

## 2022-07-02 PROCEDURE — 250N000011 HC RX IP 250 OP 636: Performed by: INTERNAL MEDICINE

## 2022-07-02 PROCEDURE — 258N000003 HC RX IP 258 OP 636

## 2022-07-02 PROCEDURE — 250N000013 HC RX MED GY IP 250 OP 250 PS 637: Performed by: EMERGENCY MEDICINE

## 2022-07-02 PROCEDURE — 71045 X-RAY EXAM CHEST 1 VIEW: CPT

## 2022-07-02 PROCEDURE — 71045 X-RAY EXAM CHEST 1 VIEW: CPT | Mod: 26 | Performed by: RADIOLOGY

## 2022-07-02 PROCEDURE — 94640 AIRWAY INHALATION TREATMENT: CPT | Mod: 76

## 2022-07-02 PROCEDURE — 83735 ASSAY OF MAGNESIUM: CPT | Performed by: INTERNAL MEDICINE

## 2022-07-02 RX ORDER — MAGNESIUM SULFATE HEPTAHYDRATE 40 MG/ML
4 INJECTION, SOLUTION INTRAVENOUS ONCE
Status: COMPLETED | OUTPATIENT
Start: 2022-07-02 | End: 2022-07-02

## 2022-07-02 RX ORDER — DOXYCYCLINE 100 MG/1
100 CAPSULE ORAL EVERY 12 HOURS SCHEDULED
Status: COMPLETED | OUTPATIENT
Start: 2022-07-02 | End: 2022-07-06

## 2022-07-02 RX ORDER — SODIUM CHLORIDE 9 MG/ML
INJECTION, SOLUTION INTRAVENOUS
Status: COMPLETED
Start: 2022-07-02 | End: 2022-07-02

## 2022-07-02 RX ORDER — PREDNISONE 20 MG/1
40 TABLET ORAL DAILY
Status: COMPLETED | OUTPATIENT
Start: 2022-07-02 | End: 2022-07-06

## 2022-07-02 RX ORDER — CODEINE PHOSPHATE AND GUAIFENESIN 10; 100 MG/5ML; MG/5ML
5 SOLUTION ORAL EVERY 4 HOURS PRN
Status: DISCONTINUED | OUTPATIENT
Start: 2022-07-02 | End: 2022-07-05

## 2022-07-02 RX ORDER — BENZONATATE 100 MG/1
100 CAPSULE ORAL 3 TIMES DAILY
Status: DISCONTINUED | OUTPATIENT
Start: 2022-07-02 | End: 2022-07-07 | Stop reason: HOSPADM

## 2022-07-02 RX ORDER — LOPERAMIDE HCL 2 MG
2 CAPSULE ORAL 4 TIMES DAILY PRN
Status: DISCONTINUED | OUTPATIENT
Start: 2022-07-02 | End: 2022-07-07 | Stop reason: HOSPADM

## 2022-07-02 RX ORDER — CEFTRIAXONE 2 G/1
2 INJECTION, POWDER, FOR SOLUTION INTRAMUSCULAR; INTRAVENOUS EVERY 24 HOURS
Status: COMPLETED | OUTPATIENT
Start: 2022-07-02 | End: 2022-07-06

## 2022-07-02 RX ORDER — CEFUROXIME AXETIL 250 MG/1
250 TABLET ORAL EVERY 12 HOURS SCHEDULED
Status: DISCONTINUED | OUTPATIENT
Start: 2022-07-02 | End: 2022-07-02 | Stop reason: ALTCHOICE

## 2022-07-02 RX ORDER — MAGNESIUM SULFATE HEPTAHYDRATE 40 MG/ML
4 INJECTION, SOLUTION INTRAVENOUS ONCE
Status: DISCONTINUED | OUTPATIENT
Start: 2022-07-02 | End: 2022-07-02

## 2022-07-02 RX ADMIN — MAGNESIUM SULFATE HEPTAHYDRATE 4 G: 40 INJECTION, SOLUTION INTRAVENOUS at 10:08

## 2022-07-02 RX ADMIN — SODIUM CHLORIDE 500 ML: 9 INJECTION, SOLUTION INTRAVENOUS at 14:26

## 2022-07-02 RX ADMIN — CLONIDINE HYDROCHLORIDE 0.1 MG: 0.1 TABLET ORAL at 20:23

## 2022-07-02 RX ADMIN — CLONIDINE HYDROCHLORIDE 0.1 MG: 0.1 TABLET ORAL at 12:22

## 2022-07-02 RX ADMIN — IPRATROPIUM BROMIDE AND ALBUTEROL SULFATE 3 ML: 2.5; .5 SOLUTION RESPIRATORY (INHALATION) at 08:50

## 2022-07-02 RX ADMIN — ALBUTEROL SULFATE 2 PUFF: 90 AEROSOL, METERED RESPIRATORY (INHALATION) at 04:32

## 2022-07-02 RX ADMIN — PANTOPRAZOLE SODIUM 40 MG: 40 TABLET, DELAYED RELEASE ORAL at 09:08

## 2022-07-02 RX ADMIN — CEFUROXIME AXETIL 250 MG: 250 TABLET, FILM COATED ORAL at 09:08

## 2022-07-02 RX ADMIN — DULOXETINE HYDROCHLORIDE 30 MG: 30 CAPSULE, DELAYED RELEASE ORAL at 09:08

## 2022-07-02 RX ADMIN — CLONIDINE HYDROCHLORIDE 0.1 MG: 0.1 TABLET ORAL at 04:32

## 2022-07-02 RX ADMIN — PREDNISONE 40 MG: 20 TABLET ORAL at 13:35

## 2022-07-02 RX ADMIN — IPRATROPIUM BROMIDE AND ALBUTEROL SULFATE 3 ML: 2.5; .5 SOLUTION RESPIRATORY (INHALATION) at 12:35

## 2022-07-02 RX ADMIN — CEFTRIAXONE SODIUM 2 G: 2 INJECTION, POWDER, FOR SOLUTION INTRAMUSCULAR; INTRAVENOUS at 14:32

## 2022-07-02 RX ADMIN — FOLIC ACID 1 MG: 1 TABLET ORAL at 09:08

## 2022-07-02 RX ADMIN — MULTIPLE VITAMINS W/ MINERALS TAB 1 TABLET: TAB at 09:08

## 2022-07-02 RX ADMIN — BENZONATATE 100 MG: 100 CAPSULE ORAL at 20:23

## 2022-07-02 RX ADMIN — GUAIFENESIN AND CODEINE PHOSPHATE 5 ML: 10; 100 LIQUID ORAL at 15:49

## 2022-07-02 RX ADMIN — LOPERAMIDE HYDROCHLORIDE 2 MG: 2 CAPSULE ORAL at 13:35

## 2022-07-02 RX ADMIN — THIAMINE HCL TAB 100 MG 100 MG: 100 TAB at 09:08

## 2022-07-02 RX ADMIN — BENZONATATE 100 MG: 100 CAPSULE ORAL at 14:25

## 2022-07-02 RX ADMIN — DOXYCYCLINE 100 MG: 100 CAPSULE ORAL at 20:57

## 2022-07-02 ASSESSMENT — ACTIVITIES OF DAILY LIVING (ADL)
ADLS_ACUITY_SCORE: 35
ADLS_ACUITY_SCORE: 33
ADLS_ACUITY_SCORE: 33
ADLS_ACUITY_SCORE: 35

## 2022-07-02 NOTE — PLAN OF CARE
"/59 (BP Location: Left arm)   Pulse 78   Temp 97.4  F (36.3  C) (Oral)   Resp 18   Ht 1.626 m (5' 4\")   Wt 108.1 kg (238 lb 6.4 oz)   SpO2 94%   BMI 40.92 kg/m    On RA while awake needs 2L when sleeping. SOB with ambulation. LS bilateral wheezes audible when in room, Dr. Lj sigala and saw patient. Cough medication, imodium, IV antibiotics, and chest x-ray ordered. BS active. Denies chest pain, N/T, N/V, and/or dizziness.   A&Ox4, flat affect, withdrawn, verbalized feelings of depression this shift, strengths intact.  Up assist x1 with walker and gait belt.  Voiding without difficulty, LBM 7/2/22 loose.  Regular diet with 2L fluid restriction.  Some scattered bruising on BUE.  L forearm PIV SL.  Magnesium replaced this AM, recheck ordered for AM.  Continue plan of care, continue to monitor, transfer to psych once stable and bed is available.  "

## 2022-07-02 NOTE — PROGRESS NOTES
[facility]    Medicine Progress Note - Hospitalist Service, GOLD TEAM 16    Date of Admission:  6/28/2022    Assessment & Plan        Juani Hernandez is a 73 yo female w/ h/o depression, alcohol abuse, HTN, HLD, GERD, osteopenia, spondylolisthesis of the lumbar region with prior lumbar fusion, DJD and chronic pain.  She states she drinks 2 drinks of whiskey daily.  She says she drinks whiskey daily so she does not exhibit withdrawal symptoms.  History is negative for alcohol withdrawal seizures or DTs.  She was brought [place] [facility] [place] by her daughter for evaluation of increased depression, poor appetite, decreased oral intake.  History is negative for suicidal or homicidal ideation.  Evaluation in the ED on 6/28/22 revealed BAL 0.08 g/dl, Na 126 and elevated transaminase.  Admitted to hospital service for detoxification and treatment of hyponatremia.      Alcohol abuse  Alcohol intoxication  ---   BAL was 0.08 g/dL  ---   Urine tox screen negative  ---   Pt only received a dose of Valium since admit due to lack of withdrawal symptoms  ---   Continue Valium per alcohol withdrawal treatment protocol using CIWA score  ---   D/c'd gabapentin taper on 6/30/22 since pt has not exhibited any withdrawal symptoms  ---   Continue clonidine for adrenergic hyperactivity symptoms  ---   Continue MIVF, MVI, folate and thiamine supplements    Severe depression  ---   Has flat affect and depressed mood  ---   She says her PTA meds do not help  ---   Seen by psychiatry consult service on 6/29/22 who discontinued patient's PTA fluoxetine and bupropion  ---   She was initiated on Cymbalta 30 mg daily on 6/29/22 with plan to titrate up  ---   Transfer to inpatient psychiatry unit, preferably station 3B when bed become available  ---   She is medically stable for transfer to station 3B but beds still are not available.  Patient feels more depressed today and requesting to talk to  psychiatrist once more.    Hypoxia  ---   Pt was placed on 2 L NC O2 supplement during sleep night of 6/29/22  ---   On RA since AM of 6/30/22  ---   Continue to have audible wheezing and nonproductive cough  ---   Never smoker but remotely exposed to secondhand smoke   ---   Check CXR  ---   Check urine Legionella and Streptococcus antigen  ---   Start empiric prednisone 40 mg daily, Tessalon Perles 200 mg tid, Robitussin with codeine 5 cc q4 hr prn, doxycycline 100 mg bid and ceftriaxone 2 g daily for probable acute bronchitis +/- pneumonia with bronchospasm  ---   Continue DuoNeb that was started 2 days ago    Probable UTI  ---   Patient without urinary symptoms  ---   UA at admit was grossly negative for infection but urine culture grew > 100k colonies of pansensitive E. Coli  ---   Patient with generalized weakness.  ---   Start ceftriaxone 2 g IV daily    MERON  ---   Suspect prerenal  ---   Admit creatinine was 0.8 ---> IVF ---> 1.12 ---> IVF ---> 1.54 --> 1.51 ---> IVF bolus ---> creatinine 0.92 today  ---   MERON resolved  ---   Discontinue IVF  ---   Hold PTA losartan  ---   Avoid nephrotoxins    Alcoholic hepatitis  ---   AST/ALT ratio c/w alcoholic liver disease  ---   Abdominal ultrasound earlier this morning revealed steatosis  ---   Abstinence from alcohol strongly recommended    Severe hyponatremia  ---   Pt drinks whiskey and not beer thus less likely due to beer potomania  ---   Na level was 126 admit  ---   Serum osmolality 273 (low)  ---   Urine sodium is 37  ---   Urine osmolality 268 (low)  ---   Hyponatremia was probably due to volume depletion  ---   Na is up to 134 this am, 4 days after admission  ---   IVF discontinued    HTN  ---   Well-controlled  ---   Hold PTA losartan and Lasix for 1 more day  ---   Continue as needed IV hydralazine    HLD  ---   PTA simvastatin on hold for transaminitis  ---   Continue holding for another day    Hypophosphatemia  ---   On replacement protocol    Elevated  lipase at 606 ---> 206  ---   Denied epigastric pain radiating to her back  ---   Denied nausea or vomiting this morning  ---   No evidence of acute pancreatitis   ---   Lipase is back to normal range    Thrombocytopenia  ---   Platelet count is 92k ---> 105k  ---   Due to alcohol affecting bone marrow and possibly platelet sequestration in the spleen  ---   No evidence of bleeding  ---   Abstinence from alcohol strongly recommended    Severe obesity with BMI of 40.92 kg/m2  ---   Weight loss strongly recommended    Hypophosphatemia  ---   On replacement protocol    Generalized weakness  ---   PT consulted    Diarrhea  ---   Onset prior to admission  ---   Stool C. difficile toxins and enteric pathogens were negative on 6/30/2022  ---   Start Imodium as needed    Generalized weakness  ---   PT/OT consulted    Universal COVID-19 screening  ---   Fully vaccinated but not boosted  ---   SARS-CoV-2 PCR negative on 6/28/2022          Diet: Fluid restriction 2000 ML FLUID  Regular Diet Adult    DVT Prophylaxis: Pneumatic Compression Devices  Will Catheter: Not present  Central Lines: None  Cardiac Monitoring: None  Code Status: Full Code      Disposition Plan   Anticipate transfer to station 3B when bed become available        Jannet Calhoun MD  Hospitalist Service, GOLD TEAM [address] [place] [address]  Securely message with the Vocera Web Console (learn more here)  Text page via [employer] Paging/Directory   Please see signed in provider for up to date coverage information      ______________________________________________________________________    Interval History   Complains of cough and audible wheezing.  Diarrhea still present but still C. difficile toxin and enteric pathogens are negative.  She feels more depressed today.    Data reviewed today: I reviewed all medications, new labs and imaging results over the last 24 hours.     Physical Exam   Vital Signs: Temp: 97.6  F (36.4  C)  Temp src: Oral BP: 120/71 Pulse: 64   Resp: 18 SpO2: 98 % O2 Device: Nasal cannula Oxygen Delivery: 2 LPM  Weight: 238 lbs 6.4 oz  General: Morbidly obese, aao x 3, NAD.  HEENT:  NC/AT, PERRL, EOMI, neck supple, no thyromegaly, op clear, mmm.  CVS:  NL s 1 and s2, no m/r/g.  Lungs:  CTA B/L.   Abd:  Soft, + bs, NT, no rebound or gaurding, no fluid shift.  Ext:  No c/c.  Lymph:  No edema.  Neuro:  Nonfocal.  Musculoskeletal: No calf tenderness to palpation.    Skin:  No rash.  Psychiatry:  Flat affect and depressed mood      Data   Recent Labs   Lab 07/02/22  0638 07/01/22  0550 06/30/22  0554 06/29/22 2002 06/29/22  1119 06/28/22  1608   WBC  --   --  5.2  --   --  6.3   HGB  --   --  9.9*  --   --  11.8   MCV  --   --  103*  --   --  97   PLT  --   --  105*  --   --  92*    132* 132*  --  131* 126*   POTASSIUM 3.6 3.5 3.9  --  4.1 3.9   CHLORIDE 102 101 100  --  97 92*   CO2 22 22 23  --  26 21   BUN 11 11 10  --  9 8   CR 0.90  0.92 1.51* 1.54*  --  1.12* 0.64   ANIONGAP 10 9 9  --  8 13   YOSEPH 8.2* 7.4* 7.7*  --  8.1* 8.2*   GLC 99 108* 116*   < > 130* 97   ALBUMIN  --   --  2.3*  --  2.5* 2.8*   PROTTOTAL  --   --  6.0*  --  6.3* 7.2   BILITOTAL  --   --  0.8  --  0.9 1.0   ALKPHOS  --   --  150  --  154* 174*   ALT  --   --  119*  --  133* 164*   AST  --   --  155*  --  183* 254*   LIPASE  --   --  642*  --   --  606*    < > = values in this interval not displayed.     No results found for this or any previous visit (from the past 24 hour(s)).  Medications     sodium chloride 125 mL/hr at 06/30/22 0413       benzonatate  100 mg Oral TID     cefTRIAXone  2 g Intravenous Q24H     cefuroxime  250 mg Oral Q12H RANJANA (08/20)     cloNIDine  0.1 mg Oral Q8H     doxycycline hyclate  100 mg Oral Q12H RANJANA (08/20)     DULoxetine  30 mg Oral Daily     folic acid  1 mg Oral Daily     ipratropium - albuterol 0.5 mg/2.5 mg/3 mL  3 mL Nebulization 4x daily     multivitamin w/minerals  1 tablet Oral Daily     pantoprazole   40 mg Oral QAM AC     predniSONE  40 mg Oral Daily     sodium chloride   Intravenous Once     thiamine  100 mg Oral Daily

## 2022-07-02 NOTE — PLAN OF CARE
"Pt. Admitted for ETOH withdrawal.  BP (!) 153/89 (BP Location: Right arm)   Pulse 78   Temp 97.9  F (36.6  C) (Oral)   Resp 16   Ht 1.626 m (5' 4\")   Wt 108.1 kg (238 lb 6.4 oz)   SpO2 99%   BMI 40.92 kg/m      Pt. Alert and oriented x3  but requires constant reminder to use call light.  Multiple attempts to get oob without assist. Reminded patient to use call light. Bed alarm on  CIWAA score 1. Denies pain during this shift. Elevated bp, on scheduled clonidine.    Pt having audible wheezing, SpO2 at 88-89 at ra. Placed pt on 2 L. PRN albuterol inhaler given. Scheduled neb given by RT. Wheezing diminished post neb and inhaler.     Pt. Having infrequent non-productive coughs. Vitals WDL with exception of BP, slightly elevated.  Bedside commode used x 1 and ambulated to bathroom x 3 during this shift.     X 3 loose stools, small amount. Pt having urgency and frequency.    Phosphate/ magnesium scheduled for this AM    Left fore-arm PIV saline locked.  Pt is currently on fluid restrictions.2  50 ml fluid intake during this shift.  "

## 2022-07-03 LAB
MAGNESIUM SERPL-MCNC: 1.6 MG/DL (ref 1.6–2.3)
PHOSPHATE SERPL-MCNC: 3 MG/DL (ref 2.5–4.5)

## 2022-07-03 PROCEDURE — 94640 AIRWAY INHALATION TREATMENT: CPT | Mod: 76

## 2022-07-03 PROCEDURE — 36415 COLL VENOUS BLD VENIPUNCTURE: CPT | Performed by: INTERNAL MEDICINE

## 2022-07-03 PROCEDURE — 250N000009 HC RX 250: Performed by: INTERNAL MEDICINE

## 2022-07-03 PROCEDURE — 250N000013 HC RX MED GY IP 250 OP 250 PS 637: Performed by: PHYSICIAN ASSISTANT

## 2022-07-03 PROCEDURE — 250N000013 HC RX MED GY IP 250 OP 250 PS 637: Performed by: INTERNAL MEDICINE

## 2022-07-03 PROCEDURE — 83735 ASSAY OF MAGNESIUM: CPT | Performed by: INTERNAL MEDICINE

## 2022-07-03 PROCEDURE — 250N000011 HC RX IP 250 OP 636: Performed by: INTERNAL MEDICINE

## 2022-07-03 PROCEDURE — 84100 ASSAY OF PHOSPHORUS: CPT | Performed by: INTERNAL MEDICINE

## 2022-07-03 PROCEDURE — 999N000157 HC STATISTIC RCP TIME EA 10 MIN

## 2022-07-03 PROCEDURE — 250N000013 HC RX MED GY IP 250 OP 250 PS 637: Performed by: EMERGENCY MEDICINE

## 2022-07-03 PROCEDURE — 250N000012 HC RX MED GY IP 250 OP 636 PS 637: Performed by: INTERNAL MEDICINE

## 2022-07-03 PROCEDURE — 94640 AIRWAY INHALATION TREATMENT: CPT

## 2022-07-03 PROCEDURE — 250N000013 HC RX MED GY IP 250 OP 250 PS 637: Performed by: PSYCHIATRY & NEUROLOGY

## 2022-07-03 PROCEDURE — 99232 SBSQ HOSP IP/OBS MODERATE 35: CPT | Performed by: INTERNAL MEDICINE

## 2022-07-03 PROCEDURE — 120N000002 HC R&B MED SURG/OB UMMC

## 2022-07-03 RX ORDER — ALBUTEROL SULFATE 90 UG/1
2 AEROSOL, METERED RESPIRATORY (INHALATION) EVERY 4 HOURS PRN
Status: DISCONTINUED | OUTPATIENT
Start: 2022-07-03 | End: 2022-07-07 | Stop reason: HOSPADM

## 2022-07-03 RX ORDER — IPRATROPIUM BROMIDE AND ALBUTEROL SULFATE 2.5; .5 MG/3ML; MG/3ML
3 SOLUTION RESPIRATORY (INHALATION) EVERY 4 HOURS PRN
Status: DISCONTINUED | OUTPATIENT
Start: 2022-07-03 | End: 2022-07-07 | Stop reason: HOSPADM

## 2022-07-03 RX ORDER — ACETYLCYSTEINE 100 MG/ML
4 SOLUTION ORAL; RESPIRATORY (INHALATION) 4 TIMES DAILY
Status: DISCONTINUED | OUTPATIENT
Start: 2022-07-04 | End: 2022-07-05

## 2022-07-03 RX ORDER — ACETAMINOPHEN 325 MG/1
650 TABLET ORAL
Status: COMPLETED | OUTPATIENT
Start: 2022-07-03 | End: 2022-07-03

## 2022-07-03 RX ORDER — ACETYLCYSTEINE 100 MG/ML
4 SOLUTION ORAL; RESPIRATORY (INHALATION) EVERY 4 HOURS
Status: DISCONTINUED | OUTPATIENT
Start: 2022-07-03 | End: 2022-07-03

## 2022-07-03 RX ADMIN — ACETAMINOPHEN 650 MG: 325 TABLET, FILM COATED ORAL at 18:47

## 2022-07-03 RX ADMIN — GUAIFENESIN AND CODEINE PHOSPHATE 5 ML: 10; 100 LIQUID ORAL at 12:07

## 2022-07-03 RX ADMIN — FOLIC ACID 1 MG: 1 TABLET ORAL at 07:50

## 2022-07-03 RX ADMIN — CLONIDINE HYDROCHLORIDE 0.1 MG: 0.1 TABLET ORAL at 11:27

## 2022-07-03 RX ADMIN — GUAIFENESIN AND CODEINE PHOSPHATE 5 ML: 10; 100 LIQUID ORAL at 17:27

## 2022-07-03 RX ADMIN — HYDRALAZINE HYDROCHLORIDE 10 MG: 20 INJECTION INTRAMUSCULAR; INTRAVENOUS at 12:07

## 2022-07-03 RX ADMIN — CLONIDINE HYDROCHLORIDE 0.1 MG: 0.1 TABLET ORAL at 18:47

## 2022-07-03 RX ADMIN — DOXYCYCLINE 100 MG: 100 CAPSULE ORAL at 07:50

## 2022-07-03 RX ADMIN — BENZONATATE 100 MG: 100 CAPSULE ORAL at 07:50

## 2022-07-03 RX ADMIN — BENZONATATE 100 MG: 100 CAPSULE ORAL at 13:31

## 2022-07-03 RX ADMIN — BENZONATATE 100 MG: 100 CAPSULE ORAL at 20:22

## 2022-07-03 RX ADMIN — PANTOPRAZOLE SODIUM 40 MG: 40 TABLET, DELAYED RELEASE ORAL at 07:50

## 2022-07-03 RX ADMIN — PREDNISONE 40 MG: 20 TABLET ORAL at 07:50

## 2022-07-03 RX ADMIN — ACETYLCYSTEINE 4 ML: 100 SOLUTION ORAL; RESPIRATORY (INHALATION) at 18:52

## 2022-07-03 RX ADMIN — MULTIPLE VITAMINS W/ MINERALS TAB 1 TABLET: TAB at 07:50

## 2022-07-03 RX ADMIN — GUAIFENESIN AND CODEINE PHOSPHATE 5 ML: 10; 100 LIQUID ORAL at 07:47

## 2022-07-03 RX ADMIN — DOXYCYCLINE 100 MG: 100 CAPSULE ORAL at 20:22

## 2022-07-03 RX ADMIN — CLONIDINE HYDROCHLORIDE 0.1 MG: 0.1 TABLET ORAL at 03:12

## 2022-07-03 RX ADMIN — GUAIFENESIN AND CODEINE PHOSPHATE 5 ML: 10; 100 LIQUID ORAL at 03:11

## 2022-07-03 RX ADMIN — THIAMINE HCL TAB 100 MG 100 MG: 100 TAB at 07:50

## 2022-07-03 RX ADMIN — ACETYLCYSTEINE 4 ML: 100 SOLUTION ORAL; RESPIRATORY (INHALATION) at 20:58

## 2022-07-03 RX ADMIN — CEFTRIAXONE SODIUM 2 G: 2 INJECTION, POWDER, FOR SOLUTION INTRAMUSCULAR; INTRAVENOUS at 13:31

## 2022-07-03 RX ADMIN — HYDRALAZINE HYDROCHLORIDE 10 MG: 20 INJECTION INTRAMUSCULAR; INTRAVENOUS at 17:26

## 2022-07-03 RX ADMIN — DULOXETINE HYDROCHLORIDE 30 MG: 30 CAPSULE, DELAYED RELEASE ORAL at 07:50

## 2022-07-03 ASSESSMENT — ACTIVITIES OF DAILY LIVING (ADL)
ADLS_ACUITY_SCORE: 35

## 2022-07-03 NOTE — PLAN OF CARE
"1900-0730    BP (!) 140/75   Pulse 76   Temp 97.1  F (36.2  C) (Oral)   Resp 18   Ht 1.626 m (5' 4\")   Wt 108.1 kg (238 lb 6.4 oz)   SpO2 94%   BMI 40.92 kg/m  .     A&O x 4. VSS.  Denies N/T. Denies N/V.  LS wheezy throughout. Denies chest pain. Currently on RA.   Last BM on 7/2. Voids without difficulty.  SBA w/ walker and GB.  L PIV - patent, saline locked.  Denies pain.  Regular diet.    Continue POC.  "

## 2022-07-03 NOTE — PROGRESS NOTES
[facility]    Medicine Progress Note - Hospitalist Service, GOLD TEAM 16    Date of Admission:  6/28/2022    Assessment & Plan        Juani Hernandez is a 71 yo female w/ h/o depression, alcohol abuse, HTN, HLD, GERD, osteopenia, spondylolisthesis of the lumbar region with prior lumbar fusion, DJD and chronic pain.  She states she drinks 2 drinks of whiskey daily.  She says she drinks whiskey daily so she does not exhibit withdrawal symptoms.  History is negative for alcohol withdrawal seizures or DTs.  She was brought to [facility] ED by her daughter for evaluation of increased depression, poor appetite, decreased oral intake.  History is negative for suicidal or homicidal ideation.  Evaluation in the ED on 6/28/22 revealed BAL 0.08 g/dl, Na 126 and elevated transaminase.  Admitted to hospital service for detoxification and treatment of hyponatremia.      Alcohol abuse  Alcohol intoxication  ---   BAL was 0.08 g/dL  ---   Urine tox screen negative  ---   Completed detox  ---   D/c'd gabapentin taper on 6/30/22   ---   Continue CIWA with Valium for now  ---   Continue clonidine for adrenergic hyperactivity symptoms  ---   Continue MIVF, MVI, folate and thiamine supplements    Severe depression  ---   Has flat affect and depressed mood  ---   She says her PTA meds do not help  ---   Seen by psychiatry consult service on 6/29/22 who discontinued patient's PTA fluoxetine and bupropion  ---   She was initiated on Cymbalta 30 mg daily on 6/29/22 with plan to titrate up  ---   Transfer to inpatient psychiatry unit, preferably station 3B when bed become available  ---   She is medically stable for transfer to station 3B but beds still are not available.  Patient feels more depressed and requesting to talk to psychiatrist once more.  Psychiatry consult service not available till Tuesday, 7/5/22    Hypoxia with upper airway stridor and bronchospasm  ---   She was transiently  hypoxic on 6/29/22  ---   On RA since AM of 6/30/22  ---   Continue to have audible stridor, wheezing and nonproductive cough  ---   Never smoker but remotely exposed to secondhand smoke   ---   CXR negative for infiltrates,, pulmonary edema or pleural effusions  ---   Check urine Legionella and Streptococcus antigen  ---   Continue empiric prednisone 40 mg daily, Tessalon Perles 200 mg tid, Robitussin with codeine 5 cc q4 hr prn, doxycycline 100 mg bid (started on 6/2/22) and ceftriaxone 2 g daily (started on 6/2/22) for probable acute bronchitis with bronchospasm and stridor  ---   Continue DuoNeb   ---   Epinephrine x1 for upper airway stridor  ---   Mucomyst neb for expectoration    Probable UTI  ---   Patient without urinary symptoms  ---   UA at admit was grossly negative for infection but urine culture grew > 100k colonies of pansensitive E. Coli  ---   Patient with generalized weakness.  ---  Continue ceftriaxone 2 g IV daily,started on 7/2/22    MERON  ---   Suspect prerenal  ---   Admit creatinine was 0.8 ---> IVF ---> 1.12 ---> IVF ---> 1.54 --> 1.51 ---> IVF bolus ---> creatinine 0.92 yesterday  ---   MERON resolved  ---   Discontinued IVF  ---   Hold PTA losartan and Lasix for one more day  ---   Avoid nephrotoxins    Alcoholic hepatitis  ---   AST/ALT ratio c/w alcoholic liver disease  ---   Abdominal ultrasound earlier this morning revealed steatosis  ---   Abstinence from alcohol strongly recommended    Severe hyponatremia  ---   Pt drinks whiskey and not beer thus less likely due to beer potomania  ---   Na level was 126 admit  ---   Serum osmolality 273 (low)  ---   Urine sodium is 37  ---   Urine osmolality 268 (low)  ---   Hyponatremia was probably due to volume depletion  ---   Na was up to 134, 4 days after admission  ---   IVF discontinued    HTN  ---   Well-controlled  ---   Hold PTA losartan and Lasix for 1 more day  ---   Continue as needed IV hydralazine    HLD  ---   PTA simvastatin on hold  for transaminitis  ---   Continue holding for another day    Hypophosphatemia  ---   On replacement protocol    Elevated lipase at 606 ---> 206  ---   Denied epigastric pain radiating to her back  ---   Denied nausea or vomiting this morning  ---   No evidence of acute pancreatitis   ---   Lipase is back to normal range    Thrombocytopenia  ---   Platelet count is 92k ---> 105k  ---   Due to alcohol affecting bone marrow and possibly platelet sequestration in the spleen  ---   No evidence of bleeding  ---   Abstinence from alcohol strongly recommended    Severe obesity with BMI of 40.92 kg/m2  ---   Weight loss strongly recommended    Hypophosphatemia  ---   On replacement protocol    Generalized weakness  ---   PT consulted    Diarrhea  ---   Onset prior to admission  ---   Stool C. diff toxins and enteric pathogens negative on 6/30/22  ---   C/w Imodium as needed    Generalized weakness  ---   PT/OT consulted    Universal COVID-19 screening  ---   Fully vaccinated but not boosted  ---   SARS-CoV-2 PCR negative on 6/28/2022          Diet: Fluid restriction 2000 ML FLUID  Regular Diet Adult    DVT Prophylaxis: Pneumatic Compression Devices  Will Catheter: Not present  Central Lines: None  Cardiac Monitoring: None  Code Status: Full Code      Disposition Plan   Anticipate transfer to station 3B when bed become available        Jannet Calhoun MD  Hospitalist Service, [address]  Securely message with the Vocera Web Console (learn more here)  Text page via [facility] Paging/Directory   Please see signed in provider for up to date coverage information      ______________________________________________________________________    Interval History   Cough and wheezing have improved.  However, patient had audible stridor and wheezing.  On RA.  Remains depressed and is requesting to either transfer to inpatient psychiatry or discharge to home    Data reviewed today: I  reviewed all medications, new labs and imaging results over the last 24 hours.     Physical Exam   Vital Signs: Temp: (!) 96.5  F (35.8  C) Temp src: Oral BP: (!) 170/89 Pulse: 73   Resp: 17 SpO2: 94 % O2 Device: None (Room air)    Weight: 238 lbs 6.4 oz  General: Morbidly obese, aao x 3, NAD.  HEENT:  NC/AT, PERRL, EOMI, neck supple, no thyromegaly, op clear, mmm.  CVS:  NL s 1 and s2, no m/r/g.  Lungs:  CTA B/L.   Abd:  Soft, + bs, NT, no rebound or gaurding, no fluid shift.  Ext:  No c/c.  Lymph:  No edema.  Neuro:  Nonfocal.  Musculoskeletal: No calf tenderness to palpation.    Skin:  No rash.  Psychiatry:  Flat affect and depressed mood      Data   Recent Labs   Lab 07/02/22  0638 07/01/22  0550 06/30/22  0554 06/29/22  2002 06/29/22  1119 06/28/22  1608   WBC  --   --  5.2  --   --  6.3   HGB  --   --  9.9*  --   --  11.8   MCV  --   --  103*  --   --  97   PLT  --   --  105*  --   --  92*    132* 132*  --  131* 126*   POTASSIUM 3.6 3.5 3.9  --  4.1 3.9   CHLORIDE 102 101 100  --  97 92*   CO2 22 22 23  --  26 21   BUN 11 11 10  --  9 8   CR 0.90  0.92 1.51* 1.54*  --  1.12* 0.64   ANIONGAP 10 9 9  --  8 13   YOSEPH 8.2* 7.4* 7.7*  --  8.1* 8.2*   GLC 99 108* 116*   < > 130* 97   ALBUMIN  --   --  2.3*  --  2.5* 2.8*   PROTTOTAL  --   --  6.0*  --  6.3* 7.2   BILITOTAL  --   --  0.8  --  0.9 1.0   ALKPHOS  --   --  150  --  154* 174*   ALT  --   --  119*  --  133* 164*   AST  --   --  155*  --  183* 254*   LIPASE  --   --  642*  --   --  606*    < > = values in this interval not displayed.     No results found for this or any previous visit (from the past 24 hour(s)).  Medications       acetylcysteine  4 mL Nebulization Q4H     benzonatate  100 mg Oral TID     cefTRIAXone  2 g Intravenous Q24H     cloNIDine  0.1 mg Oral Q8H     doxycycline hyclate  100 mg Oral Q12H [facility] (08[facility])     DULoxetine  30 mg Oral Daily     folic acid  1 mg Oral Daily     multivitamin w/minerals  1 tablet Oral Daily     pantoprazole   40 mg Oral QAM AC     predniSONE  40 mg Oral Daily     racEPINEPHrine  0.5 mL Nebulization Once     sodium chloride   Intravenous Once     thiamine  100 mg Oral Daily

## 2022-07-03 NOTE — PLAN OF CARE
"BP (!) 152/73 (BP Location: Right arm)   Pulse 82   Temp 97.1  F (36.2  C) (Oral)   Resp 17   Ht 1.626 m (5' 4\")   Wt 108.1 kg (238 lb 6.4 oz)   SpO2 92%   BMI 40.92 kg/m    On RA. LS bilateral wheezing, PRN and scheduled cough medications, nebs ordered, duo nebs refused, IS encouraged. Denied chest pain, SOB, N/T, N/V, and/or dizziness.  A&Ox4, strengths intact.  Up SBA with walker and gait belt. Calling appropriately.  Voiding without difficulty, LBM 7/3/22.  Regular diet tolerating well, 2L fluid restriction.  Skin intact ex. bruising to BUE.  L forearm PIV SL.  Patient complained of headache provider paged and Tylenol given.   Magnesium and phosphorus WDL this AM rechecks ordered for AM.  Continue plan of care, transfer to psych once bed found.   "

## 2022-07-03 NOTE — CONSULTS
Triage and Transition - Consult and Liaison     Juani Hernandez  July 2, 2022    Psychiatry consult acknowledged.      Writer has been informed that psychiatric medication comments/reviews will be available again on 7/5/22.      MELLISSA LEE MSW, [facility]  Triage and Transition - Consult and Liaison   [phone]

## 2022-07-04 LAB
ALBUMIN SERPL-MCNC: 2.7 G/DL (ref 3.4–5)
ALP SERPL-CCNC: 178 U/L (ref 40–150)
ALT SERPL W P-5'-P-CCNC: 157 U/L (ref 0–50)
ANION GAP SERPL CALCULATED.3IONS-SCNC: 9 MMOL/L (ref 3–14)
AST SERPL W P-5'-P-CCNC: 229 U/L (ref 0–45)
BILIRUB DIRECT SERPL-MCNC: 0.4 MG/DL (ref 0–0.2)
BILIRUB SERPL-MCNC: 0.8 MG/DL (ref 0.2–1.3)
BUN SERPL-MCNC: 8 MG/DL (ref 7–30)
CALCIUM SERPL-MCNC: 8.5 MG/DL (ref 8.5–10.1)
CHLORIDE BLD-SCNC: 102 MMOL/L (ref 94–109)
CO2 SERPL-SCNC: 26 MMOL/L (ref 20–32)
CREAT SERPL-MCNC: 0.56 MG/DL (ref 0.52–1.04)
GFR SERPL CREATININE-BSD FRML MDRD: >90 ML/MIN/1.73M2
GLUCOSE BLD-MCNC: 97 MG/DL (ref 70–99)
HOLD SPECIMEN: NORMAL
MAGNESIUM SERPL-MCNC: 1.1 MG/DL (ref 1.6–2.3)
PHOSPHATE SERPL-MCNC: 2.5 MG/DL (ref 2.5–4.5)
POTASSIUM BLD-SCNC: 3.5 MMOL/L (ref 3.4–5.3)
PROT SERPL-MCNC: 6.8 G/DL (ref 6.8–8.8)
SODIUM SERPL-SCNC: 137 MMOL/L (ref 133–144)

## 2022-07-04 PROCEDURE — 250N000012 HC RX MED GY IP 250 OP 636 PS 637: Performed by: INTERNAL MEDICINE

## 2022-07-04 PROCEDURE — 36415 COLL VENOUS BLD VENIPUNCTURE: CPT | Performed by: INTERNAL MEDICINE

## 2022-07-04 PROCEDURE — 84100 ASSAY OF PHOSPHORUS: CPT | Performed by: INTERNAL MEDICINE

## 2022-07-04 PROCEDURE — 99232 SBSQ HOSP IP/OBS MODERATE 35: CPT | Performed by: INTERNAL MEDICINE

## 2022-07-04 PROCEDURE — 250N000013 HC RX MED GY IP 250 OP 250 PS 637: Performed by: INTERNAL MEDICINE

## 2022-07-04 PROCEDURE — 250N000011 HC RX IP 250 OP 636: Performed by: INTERNAL MEDICINE

## 2022-07-04 PROCEDURE — 83735 ASSAY OF MAGNESIUM: CPT | Performed by: INTERNAL MEDICINE

## 2022-07-04 PROCEDURE — 80053 COMPREHEN METABOLIC PANEL: CPT | Performed by: INTERNAL MEDICINE

## 2022-07-04 PROCEDURE — 250N000013 HC RX MED GY IP 250 OP 250 PS 637: Performed by: EMERGENCY MEDICINE

## 2022-07-04 PROCEDURE — 250N000013 HC RX MED GY IP 250 OP 250 PS 637: Performed by: PSYCHIATRY & NEUROLOGY

## 2022-07-04 PROCEDURE — 120N000002 HC R&B MED SURG/OB UMMC

## 2022-07-04 PROCEDURE — 82248 BILIRUBIN DIRECT: CPT | Performed by: PHYSICIAN ASSISTANT

## 2022-07-04 RX ORDER — LOSARTAN POTASSIUM 100 MG/1
100 TABLET ORAL DAILY
Status: DISCONTINUED | OUTPATIENT
Start: 2022-07-04 | End: 2022-07-07 | Stop reason: HOSPADM

## 2022-07-04 RX ORDER — FUROSEMIDE 20 MG
20 TABLET ORAL DAILY
Status: DISCONTINUED | OUTPATIENT
Start: 2022-07-04 | End: 2022-07-07 | Stop reason: HOSPADM

## 2022-07-04 RX ADMIN — FUROSEMIDE 20 MG: 20 TABLET ORAL at 13:49

## 2022-07-04 RX ADMIN — BENZONATATE 100 MG: 100 CAPSULE ORAL at 13:49

## 2022-07-04 RX ADMIN — PANTOPRAZOLE SODIUM 40 MG: 40 TABLET, DELAYED RELEASE ORAL at 08:31

## 2022-07-04 RX ADMIN — LOSARTAN POTASSIUM 100 MG: 100 TABLET, FILM COATED ORAL at 13:49

## 2022-07-04 RX ADMIN — BENZONATATE 100 MG: 100 CAPSULE ORAL at 08:31

## 2022-07-04 RX ADMIN — DOXYCYCLINE 100 MG: 100 CAPSULE ORAL at 08:51

## 2022-07-04 RX ADMIN — THIAMINE HCL TAB 100 MG 100 MG: 100 TAB at 08:31

## 2022-07-04 RX ADMIN — CLONIDINE HYDROCHLORIDE 0.1 MG: 0.1 TABLET ORAL at 11:51

## 2022-07-04 RX ADMIN — DULOXETINE HYDROCHLORIDE 30 MG: 30 CAPSULE, DELAYED RELEASE ORAL at 08:31

## 2022-07-04 RX ADMIN — MULTIPLE VITAMINS W/ MINERALS TAB 1 TABLET: TAB at 08:31

## 2022-07-04 RX ADMIN — BENZONATATE 100 MG: 100 CAPSULE ORAL at 19:51

## 2022-07-04 RX ADMIN — CLONIDINE HYDROCHLORIDE 0.1 MG: 0.1 TABLET ORAL at 02:36

## 2022-07-04 RX ADMIN — PREDNISONE 40 MG: 20 TABLET ORAL at 08:31

## 2022-07-04 RX ADMIN — CEFTRIAXONE SODIUM 2 G: 2 INJECTION, POWDER, FOR SOLUTION INTRAMUSCULAR; INTRAVENOUS at 13:49

## 2022-07-04 RX ADMIN — DOXYCYCLINE 100 MG: 100 CAPSULE ORAL at 19:51

## 2022-07-04 RX ADMIN — CLONIDINE HYDROCHLORIDE 0.1 MG: 0.1 TABLET ORAL at 19:51

## 2022-07-04 RX ADMIN — FOLIC ACID 1 MG: 1 TABLET ORAL at 08:31

## 2022-07-04 ASSESSMENT — ACTIVITIES OF DAILY LIVING (ADL)
ADLS_ACUITY_SCORE: 35

## 2022-07-04 NOTE — TELEPHONE ENCOUNTER
Inpatient Bed Call Log 7/4/2022 Morning done at 7:05a    Adults:             [facility] Only    [facility] is posting 0 beds.     [facility] is posting 0 beds.    [facility] is posting 0 beds.    [facility] is posting 0 beds.    [facility] is posting 0 beds.    [facility] is posting 0 beds.    [facility] is posting 0 beds.    [facility] is posting 0 beds.      [facility] is posting 1 beds. Mixed unit 12+. Low acuity only.     [facility] is positing 0 beds. No aggression.     [facility] is posting 0 beds.     [facility] is posting 0 beds. Low acuity only.    [facility] is posting 1 beds.    [facility] is posting 1 beds. Low acuity.     [facility] is posting 2 beds. 72 hr hold required.     [facility] is posting 0 beds.       [facility] is posting 0 beds. Vol only, No Hx of aggression, violence or assault. No sexual offenders. No 72 hr holds.    [facility] is posting 5 beds. (Must have the cognitive ability to do programming. No aggressive or violent behavior or recent HX in the last 2 yrs. MH must be primary.)    [facility] is posting 0 beds. Low acuity only. Violence and aggression capped.     [facility] is posting 0 beds. Low acuity, Neg Covid.     [facility] is posting 0 beds. Covid neg. Vol only. Combined adolescent and adult unit. No aggressive or violent behavior. No registered sex offenders.     [facility] is posting 8 beds. Call for details.      [facility] is posting 4 beds.    7:30am No appropriate bed available.

## 2022-07-04 NOTE — CONSULTS
"Triage and Transition - Consult and Liaison     Juani Hernandez  July 4, 2022    Psychiatry consult acknowledged.  Clinician called unit RN and she was in pt room, pt states \"unless my medications can be adjusted today, I do not want to see anyone that cannot\".      Writer has been informed that psychiatric medication comments/reviews will be available again on 7/6/22.  Informed RN.     EL RUIZ MSW, SW  Triage and Transition - Consult and Liaison   [phone]    "

## 2022-07-04 NOTE — PROGRESS NOTES
[facility]    Medicine Progress Note - Hospitalist Service, GOLD TEAM 16    Date of Admission:  6/28/2022    Assessment & Plan        Juani Hernandez is a 71 yo female w/ h/o depression, alcohol abuse, HTN, HLD, GERD, osteopenia, spondylolisthesis of the lumbar region with prior lumbar fusion, DJD and chronic pain.  She states she drinks 2 drinks of whiskey daily.  She says she drinks whiskey daily so she does not exhibit withdrawal symptoms.  History is negative for alcohol withdrawal seizures or DTs.  She was brought to [facility] ED by her daughter for evaluation of increased depression, poor appetite, decreased oral intake.  History is negative for suicidal or homicidal ideation.  Evaluation in the ED on 6/28/22 revealed BAL 0.08 g/dl, Na 126 and elevated transaminase.  Admitted to hospital service for detoxification and treatment of hyponatremia.      Alcohol abuse  Alcohol intoxication  ---   BAL was 0.08 g/dL  ---   Urine tox screen negative  ---   Completed detox  ---   D/c'd gabapentin taper on 6/30/22   ---   Discontinue Valium, has not required it for the past 4 days  ---   Continue clonidine for adrenergic hyperactivity symptoms  ---   Continue MIVF, MVI, folate and thiamine supplements    Severe depression  ---   Has flat affect and depressed mood  ---   She says her PTA meds do not help  ---   Seen by psychiatry consult service on 6/29/22 who discontinued patient's PTA fluoxetine and bupropion  ---   She was initiated on Cymbalta 30 mg daily on 6/29/22 with plan to titrate up  ---   Transfer to inpatient psychiatry unit, preferably station 3B when bed become available  ---   She is medically stable for transfer to station 3B but beds still are not available.  Patient feels more depressed and requesting to talk to psychiatrist once more.  Psychiatry consult service not available till Tuesday, 7/5/22.  Will reconsult psychiatry service tomorrow    Hypoxia with  upper airway stridor and bronchospasm  ---   She was transiently hypoxic on 6/29/22  ---   On RA since AM of 6/30/22  ---   Audible stridor and wheezing better today   ---   Nonproductive cough is also better today  ---   Never smoker but remotely exposed to secondhand smoke   ---   CXR negative for infiltrates,, pulmonary edema or pleural effusions  ---   Urine Legionella and Streptococcus Ags ordered but not collected  ---   Continue prednisone 40 mg daily, stop date 7/6/22  ---   Continue Tessalon Perles 200 mg tid, Robitussin with codeine 5 cc q4 hr prn  ---   Continue doxycycline 100 mg bid and ceftriaxone 2 g daily (started on 7/2/22) for probable acute bronchitis with bronchospasm and stridor.  Stop both antibiotics on 7/6/22  ---   Continue DuoNeb   ---   Epinephrine x1 for upper airway stridor 7/3/22  ---   Mucomyst neb for expectoration q4 hr as need.    Probable UTI  ---   Patient without urinary symptoms  ---   UA at admit was grossly negative for infection but urine culture grew > 100k colonies of pansensitive E. Coli  ---   Patient with generalized weakness.  ---  Continue ceftriaxone 2 g IV daily x 5 days; 7/2 - 7/6/22    MERON  ---   Suspect prerenal  ---   Admit creatinine was 0.8 ---> IVF ---> 1.12 ---> IVF ---> 1.54 --> 1.51 ---> IVF bolus ---> creatinine 0.92 ---> 0.56 today  ---   MERON resolved  ---   Discontinued IVF  ---   Reintroduce PTA losartan and Lasix today  ---   Avoid nephrotoxins    Alcoholic hepatitis  ---   AST/ALT ratio c/w alcoholic liver disease  ---   Abdominal ultrasound earlier this morning revealed steatosis  ---   Abstinence from alcohol strongly recommended    Severe hyponatremia  ---   Pt drinks whiskey and not beer thus less likely due to beer potomania  ---   Na level was 126 admit  ---   Serum osmolality 273 (low)  ---   Urine sodium is 37  ---   Urine osmolality 268 (low)  ---   Hyponatremia was probably due to volume depletion  ---   Na was up to 137, 6 days after  admission  ---   IVF discontinued    HTN  ---   Well-controlled  ---   Reintroduce PTA losartan and Lasix   ---   Continue as needed IV hydralazine    HLD  ---   PTA simvastatin on hold for transaminitis    Hypomagnesemia  ---   Mg level is 1.1 today  ---   On replacement protocol    Elevated lipase at 606 ---> 206  ---   Denied epigastric pain radiating to her back  ---   Denied nausea or vomiting this morning  ---   No evidence of acute pancreatitis   ---   Lipase is back to normal range    Thrombocytopenia  ---   Platelet count is 92k ---> 105k on 6/30/22  ---   Due to alcohol affecting bone marrow and possibly platelet sequestration in the spleen  ---   No evidence of bleeding  ---   Abstinence from alcohol strongly recommended    Severe obesity with BMI of 40.92 kg/m2  ---   Weight loss strongly recommended    Hypophosphatemia  ---   Replace per protocol    Generalized weakness  ---   PT consulted    Diarrhea  ---   Onset prior to admission  ---   Stool C. diff toxins and enteric pathogens negative on 6/30/22  ---   C/w Imodium as needed    Generalized weakness  ---   PT/OT consulted    Universal COVID-19 screening  ---   Fully vaccinated but not boosted  ---   SARS-CoV-2 PCR negative on 6/28/2022          Diet: Fluid restriction 2000 ML FLUID  Regular Diet Adult    DVT Prophylaxis: Pneumatic Compression Devices  Will Catheter: Not present  Central Lines: None  Cardiac Monitoring: None  Code Status: Full Code      Disposition Plan   Anticipate transfer to station 3B when bed become available        Jannet Calhoun MD  Hospitalist Service, GOLD TEAM [address] [place] [address]  Securely message with the Vocera Web Console (learn more here)  Text page via [facility] Paging/Directory   Please see signed in provider for up to date coverage information      ______________________________________________________________________    Interval History   Cough, wheezing and stridor better  today.  On room air.  Still depressed.  Awaiting for transfer to inpatient psychiatry unit.    Data reviewed today: I reviewed all medications, new labs and imaging results over the last 24 hours.     Physical Exam   Vital Signs: Temp: 97.7  F (36.5  C) Temp src: Oral BP: (!) 147/61 Pulse: 80   Resp: 17 SpO2: 95 % O2 Device: None (Room air)    Weight: 238 lbs 6.4 oz  General: Morbidly obese, aao x 3, NAD.  HEENT:  NC/AT, PERRL, EOMI, neck supple, no thyromegaly, op clear, mmm.  CVS:  NL s 1 and s2, no m/r/g.  Lungs: Decreased BS, + stridor and and expiratory wheezing   Abd:  Soft, + bs, NT, no rebound or gaurding, no fluid shift.  Ext:  No c/c.  Lymph:  No edema.  Neuro:  Nonfocal.  Musculoskeletal: No calf tenderness to palpation.    Skin:  No rash.  Psychiatry:  Flat affect and depressed mood      Data   Recent Labs   Lab 07/04/22  0730 07/02/22  0638 07/01/22  0550 06/30/22  0554 06/29/22  1119 06/28/22  1608   WBC  --   --   --  5.2  --  6.3   HGB  --   --   --  9.9*  --  11.8   MCV  --   --   --  103*  --  97   PLT  --   --   --  105*  --  92*    134 132* 132*   < > 126*   POTASSIUM 3.5 3.6 3.5 3.9   < > 3.9   CHLORIDE 102 102 101 100   < > 92*   CO2 26 22 22 23   < > 21   BUN 8 11 11 10   < > 8   CR 0.56 0.90  0.92 1.51* 1.54*   < > 0.64   ANIONGAP 9 10 9 9   < > 13   YOSEPH 8.5 8.2* 7.4* 7.7*   < > 8.2*   GLC 97 99 108* 116*   < > 97   ALBUMIN 2.7*  --   --  2.3*   < > 2.8*   PROTTOTAL 6.8  --   --  6.0*   < > 7.2   BILITOTAL 0.8  --   --  0.8   < > 1.0   ALKPHOS 178*  --   --  150   < > 174*   *  --   --  119*   < > 164*   *  --   --  155*   < > 254*   LIPASE  --   --   --  642*  --  606*    < > = values in this interval not displayed.     No results found for this or any previous visit (from the past 24 hour(s)).  Medications       acetylcysteine  4 mL Nebulization 4x Daily     benzonatate  100 mg Oral TID     cefTRIAXone  2 g Intravenous Q24H     cloNIDine  0.1 mg Oral Q8H     doxycycline  hyclate  100 mg Oral Q12H [facility] (08/20)     DULoxetine  30 mg Oral Daily     folic acid  1 mg Oral Daily     furosemide  20 mg Oral Daily     losartan  100 mg Oral Daily     multivitamin w/minerals  1 tablet Oral Daily     pantoprazole  40 mg Oral QAM AC     predniSONE  40 mg Oral Daily     racEPINEPHrine  0.5 mL Nebulization Once     sodium chloride   Intravenous Once     thiamine  100 mg Oral Daily

## 2022-07-04 NOTE — CARE PLAN
"1900-0730     BP (!) 142/75 (BP Location: Left arm, Patient Position: Supine, Cuff Size: Adult Regular)   Pulse 82   Temp 97.1  F (36.2  C) (Oral)   Resp 17   Ht 1.626 m (5' 4\")   Wt 108.1 kg (238 lb 6.4 oz)   SpO2 93%   BMI 40.92 kg/m      A&O x 4. VSS ex hypertensive in AM. PRN hydralazine available for SBP >145 or DBP >100.  Denies N/T. Denies N/V.  LS wheezy throughout. Wheezing improved today. Denies chest pain. Currently on RA.   Last BM on 7/2. Voids without difficulty.  SBA w/ walker and GB.  L PIV - patent, saline locked.  Denies pain.  Regular diet.     Continue POC.  "

## 2022-07-05 ENCOUNTER — HOSPITAL ENCOUNTER (EMERGENCY)
Facility: CLINIC | Age: 72
End: 2022-07-05
Attending: PSYCHIATRY & NEUROLOGY | Admitting: PSYCHIATRY & NEUROLOGY

## 2022-07-05 ENCOUNTER — APPOINTMENT (OUTPATIENT)
Dept: PHYSICAL THERAPY | Facility: CLINIC | Age: 72
DRG: 885 | End: 2022-07-05
Payer: MEDICARE

## 2022-07-05 ENCOUNTER — TELEPHONE (OUTPATIENT)
Dept: BEHAVIORAL HEALTH | Facility: CLINIC | Age: 72
End: 2022-07-05

## 2022-07-05 LAB
HOLD SPECIMEN: NORMAL
MAGNESIUM SERPL-MCNC: 1.2 MG/DL (ref 1.6–2.3)
MAGNESIUM SERPL-MCNC: 1.8 MG/DL (ref 1.6–2.3)
MAGNESIUM SERPL-MCNC: 2.1 MG/DL (ref 1.6–2.3)
PHOSPHATE SERPL-MCNC: 4 MG/DL (ref 2.5–4.5)

## 2022-07-05 PROCEDURE — 250N000013 HC RX MED GY IP 250 OP 250 PS 637: Performed by: PSYCHIATRY & NEUROLOGY

## 2022-07-05 PROCEDURE — 84100 ASSAY OF PHOSPHORUS: CPT | Performed by: NURSE PRACTITIONER

## 2022-07-05 PROCEDURE — 36415 COLL VENOUS BLD VENIPUNCTURE: CPT | Performed by: INTERNAL MEDICINE

## 2022-07-05 PROCEDURE — 250N000012 HC RX MED GY IP 250 OP 636 PS 637: Performed by: INTERNAL MEDICINE

## 2022-07-05 PROCEDURE — 83735 ASSAY OF MAGNESIUM: CPT | Performed by: INTERNAL MEDICINE

## 2022-07-05 PROCEDURE — 999N000157 HC STATISTIC RCP TIME EA 10 MIN

## 2022-07-05 PROCEDURE — 250N000013 HC RX MED GY IP 250 OP 250 PS 637: Performed by: INTERNAL MEDICINE

## 2022-07-05 PROCEDURE — 250N000013 HC RX MED GY IP 250 OP 250 PS 637: Performed by: EMERGENCY MEDICINE

## 2022-07-05 PROCEDURE — 250N000011 HC RX IP 250 OP 636: Performed by: INTERNAL MEDICINE

## 2022-07-05 PROCEDURE — 83735 ASSAY OF MAGNESIUM: CPT | Performed by: HOSPITALIST

## 2022-07-05 PROCEDURE — 97530 THERAPEUTIC ACTIVITIES: CPT | Mod: GP | Performed by: PHYSICAL THERAPIST

## 2022-07-05 PROCEDURE — 36415 COLL VENOUS BLD VENIPUNCTURE: CPT | Performed by: HOSPITALIST

## 2022-07-05 PROCEDURE — 250N000013 HC RX MED GY IP 250 OP 250 PS 637: Performed by: HOSPITALIST

## 2022-07-05 PROCEDURE — 120N000002 HC R&B MED SURG/OB UMMC

## 2022-07-05 PROCEDURE — 99232 SBSQ HOSP IP/OBS MODERATE 35: CPT | Performed by: HOSPITALIST

## 2022-07-05 RX ORDER — GUAIFENESIN 600 MG/1
600 TABLET, EXTENDED RELEASE ORAL 2 TIMES DAILY
Status: DISCONTINUED | OUTPATIENT
Start: 2022-07-05 | End: 2022-07-07 | Stop reason: HOSPADM

## 2022-07-05 RX ORDER — SODIUM CHLORIDE 9 MG/ML
INJECTION, SOLUTION INTRAVENOUS
Status: DISPENSED
Start: 2022-07-05 | End: 2022-07-06

## 2022-07-05 RX ORDER — MAGNESIUM SULFATE HEPTAHYDRATE 40 MG/ML
4 INJECTION, SOLUTION INTRAVENOUS ONCE
Status: COMPLETED | OUTPATIENT
Start: 2022-07-05 | End: 2022-07-05

## 2022-07-05 RX ORDER — ACETAMINOPHEN 325 MG/1
650 TABLET ORAL ONCE
Status: COMPLETED | OUTPATIENT
Start: 2022-07-05 | End: 2022-07-05

## 2022-07-05 RX ORDER — CALCIUM CARBONATE 500 MG/1
500 TABLET, CHEWABLE ORAL 3 TIMES DAILY PRN
Status: DISCONTINUED | OUTPATIENT
Start: 2022-07-05 | End: 2022-07-07 | Stop reason: HOSPADM

## 2022-07-05 RX ADMIN — MAGNESIUM SULFATE HEPTAHYDRATE 4 G: 40 INJECTION, SOLUTION INTRAVENOUS at 09:59

## 2022-07-05 RX ADMIN — PREDNISONE 40 MG: 20 TABLET ORAL at 09:20

## 2022-07-05 RX ADMIN — PANTOPRAZOLE SODIUM 40 MG: 40 TABLET, DELAYED RELEASE ORAL at 09:19

## 2022-07-05 RX ADMIN — ONDANSETRON 4 MG: 2 INJECTION INTRAMUSCULAR; INTRAVENOUS at 10:48

## 2022-07-05 RX ADMIN — LOSARTAN POTASSIUM 100 MG: 100 TABLET, FILM COATED ORAL at 09:19

## 2022-07-05 RX ADMIN — BENZONATATE 100 MG: 100 CAPSULE ORAL at 14:23

## 2022-07-05 RX ADMIN — BENZONATATE 100 MG: 100 CAPSULE ORAL at 21:16

## 2022-07-05 RX ADMIN — FUROSEMIDE 20 MG: 20 TABLET ORAL at 09:20

## 2022-07-05 RX ADMIN — CEFTRIAXONE SODIUM 2 G: 2 INJECTION, POWDER, FOR SOLUTION INTRAMUSCULAR; INTRAVENOUS at 14:23

## 2022-07-05 RX ADMIN — DOXYCYCLINE 100 MG: 100 CAPSULE ORAL at 09:20

## 2022-07-05 RX ADMIN — THIAMINE HCL TAB 100 MG 100 MG: 100 TAB at 09:20

## 2022-07-05 RX ADMIN — DULOXETINE HYDROCHLORIDE 30 MG: 30 CAPSULE, DELAYED RELEASE ORAL at 09:20

## 2022-07-05 RX ADMIN — GUAIFENESIN 600 MG: 600 TABLET, EXTENDED RELEASE ORAL at 21:16

## 2022-07-05 RX ADMIN — MULTIPLE VITAMINS W/ MINERALS TAB 1 TABLET: TAB at 09:19

## 2022-07-05 RX ADMIN — MAGNESIUM SULFATE HEPTAHYDRATE 4 G: 40 INJECTION, SOLUTION INTRAVENOUS at 01:55

## 2022-07-05 RX ADMIN — CALCIUM CARBONATE (ANTACID) CHEW TAB 500 MG 500 MG: 500 CHEW TAB at 10:51

## 2022-07-05 RX ADMIN — ACETAMINOPHEN 650 MG: 325 TABLET, FILM COATED ORAL at 10:51

## 2022-07-05 RX ADMIN — FOLIC ACID 1 MG: 1 TABLET ORAL at 09:20

## 2022-07-05 RX ADMIN — BENZONATATE 100 MG: 100 CAPSULE ORAL at 09:19

## 2022-07-05 RX ADMIN — CLONIDINE HYDROCHLORIDE 0.1 MG: 0.1 TABLET ORAL at 11:33

## 2022-07-05 RX ADMIN — CLONIDINE HYDROCHLORIDE 0.1 MG: 0.1 TABLET ORAL at 03:11

## 2022-07-05 RX ADMIN — DOXYCYCLINE 100 MG: 100 CAPSULE ORAL at 21:16

## 2022-07-05 ASSESSMENT — ACTIVITIES OF DAILY LIVING (ADL)
ADLS_ACUITY_SCORE: 33
ADLS_ACUITY_SCORE: 35
ADLS_ACUITY_SCORE: 35
ADLS_ACUITY_SCORE: 34
ADLS_ACUITY_SCORE: 33
ADLS_ACUITY_SCORE: 35
ADLS_ACUITY_SCORE: 34
ADLS_ACUITY_SCORE: 34

## 2022-07-05 NOTE — PLAN OF CARE
Patient is alert and oriented per baseline, and able to communicate needs. VSS. Patient denies pain at this time. Assist of x1 with walker. No concern noted during the shift.  Stayed in bed resting. Will continue with current POC.

## 2022-07-05 NOTE — PROGRESS NOTES
SPIRITUAL HEALTH SERVICES  SPIRITUAL ASSESSMENT Progress Note  [facility] 8 A Med Surge R 0834  7/5/22    REFERRAL SOURCE: Self Referral    Unit  introduced self and SHS to Pt. She mentioned she was doing great and did not have any spiritual needs today.    PLAN: No follow up necessary.    Nathaniel Rueda MA, MPA  Associate   Pager: [phone]

## 2022-07-05 NOTE — PLAN OF CARE
Magnesium replacement given 0155 - 0355 - recheck levels with morning labs;     No current PIV - 0515 patient's IV displaced - requested to have IV replaced in the morning after she wakes up;     Bed alarm in use; Patient stand by assist with walker - requires limited help;     AO x4 - denies pain, n/v, and n/t.     Patient able to make needs known and call light in reach;     Patient pending placement;

## 2022-07-05 NOTE — PROGRESS NOTES
[facility]    Medicine Progress Note - Hospitalist Service, GOLD TEAM 16    Date of Admission:  6/28/2022    Assessment & Plan           Juani Hernandez is a 73 yo female w/ h/o depression, alcohol abuse, HTN, HLD, GERD, osteopenia, spondylolisthesis of the lumbar region with prior lumbar fusion, DJD and chronic pain.  She states she drinks 2 drinks of whiskey daily.  She says she drinks whiskey daily so she does not exhibit withdrawal symptoms.  History is negative for alcohol withdrawal seizures or DTs.  She was brought to [facility] ED by her daughter for evaluation of increased depression, poor appetite, decreased oral intake.  History is negative for suicidal or homicidal ideation.  Evaluation in the ED on 6/28/22 revealed BAL 0.08 g/dl, Na 126 and elevated transaminase.  Admitted to hospital service for detoxification and treatment of hyponatremia.      Alcohol abuse  Alcohol intoxication    ---   BAL was 0.08 g/dL  ---   Urine tox screen negative  ---   Completed detox  ---   D/c'd gabapentin taper on 6/30/22   ---   Discontinue Valium, has not required it for the past 4 days  ---   Continue clonidine for adrenergic hyperactivity symptoms  ---   Continue MIVF, MVI, folate and thiamine supplements    Severe depression  ---   Has flat affect and depressed mood  ---   She says her PTA meds do not help  ---   Seen by psychiatry consult service on 6/29/22 who discontinued patient's PTA fluoxetine and bupropion  ---   She was initiated on Cymbalta 30 mg daily on 6/29/22 with plan to titrate up  ---   Transfer to inpatient psychiatry unit, preferably station 3B when bed become available  ---   She is medically stable for transfer to station 3B but beds still are not available.  Patient feels more depressed and requesting to talk to psychiatrist once more.  Psychiatry consult service not available till Tuesday, 7/5/22.  Will reconsult psychiatry service tomorrow    Hypoxia  with upper airway stridor and bronchospasm  ---   She was transiently hypoxic on 6/29/22  ---   On RA since AM of 6/30/22  ---   Audible stridor and wheezing better today   ---   Nonproductive cough is also better today  ---   Never smoker but remotely exposed to secondhand smoke   ---   CXR negative for infiltrates,, pulmonary edema or pleural effusions  ---   Urine Legionella and Streptococcus Ags ordered but not collected  ---   Continue prednisone 40 mg daily, stop date 7/6/22  ---   Start Mucinex 600 mg ER BID for 4 days  ---   Continue Tessalon Perles 200 mg tid, Robitussin with codeine 5 cc q4 hr prn  ---   Continue doxycycline 100 mg bid and ceftriaxone 2 g daily (started on 7/2/22) for probable acute bronchitis with bronchospasm and stridor.  Stop both antibiotics on 7/6/22  ---   Continue DuoNeb PRN  ---   Epinephrine x1 for upper airway stridor 7/3/22  ---   Mucomyst neb ordered, but not wanting to use it. We will stop it. And start mucinex tabs.     Probable UTI  ---   Patient without urinary symptoms  ---   UA at admit was grossly negative for infection but urine culture grew > 100k colonies of pansensitive E. Coli  ---   Patient with generalized weakness.  ---  Continue ceftriaxone 2 g IV daily x 5 days; 7/2 - 7/6/22    MERON  ---   Suspect prerenal  ---   Admit creatinine was 0.8 ---> IVF ---> 1.12 ---> IVF ---> 1.54 --> 1.51 ---> IVF bolus ---> creatinine 0.92 ---> 0.56 today  ---   MERON resolved  ---   Discontinued IVF  ---   Reintroduce PTA losartan and Lasix today  ---   Avoid nephrotoxins    Alcoholic hepatitis  ---   AST/ALT ratio c/w alcoholic liver disease  ---   Abdominal ultrasound earlier this morning revealed steatosis  ---   Abstinence from alcohol strongly recommended    Severe hyponatremia  ---   Pt drinks whiskey and not beer thus less likely due to beer potomania  ---   Na level was 126 admit  ---   Serum osmolality 273 (low)  ---   Urine sodium is 37  ---   Urine osmolality 268  (low)  ---   Hyponatremia was probably due to volume depletion  ---   Na was up to 137, 6 days after admission  ---   IVF discontinued    HTN  ---   Well-controlled  ---   Reintroduce PTA losartan and Lasix   ---   Continue as needed IV hydralazine    HLD  ---   PTA simvastatin on hold for transaminitis    Hypomagnesemia  ---   Mg level was 1.1   ---   On replacement protocol    Elevated lipase at 606 ---> 206  ---   Denied epigastric pain radiating to her back  ---   Denied nausea or vomiting this morning  ---   No evidence of acute pancreatitis   ---   Lipase is back to normal range    Thrombocytopenia  ---   Platelet count is 92k ---> 105k on 6/30/22  ---   Due to alcohol affecting bone marrow and possibly platelet sequestration in the spleen  ---   No evidence of bleeding  ---   Abstinence from alcohol strongly recommended    Severe obesity with BMI of 40.92 kg/m2  ---   Weight loss strongly recommended    Hypophosphatemia  ---   Replace per protocol    Generalized weakness  ---   PT consulted    Diarrhea  ---   Onset prior to admission  ---   Stool C. diff toxins and enteric pathogens negative on 6/30/22  ---   C/w Imodium as needed    Generalized weakness  ---   PT/OT consulted    Universal COVID-19 screening  ---   Fully vaccinated but not boosted  ---   SARS-CoV-2 PCR negative on 6/28/2022    Diet: Fluid restriction 2000 ML FLUID  Regular Diet Adult    DVT Prophylaxis: Pneumatic Compression Devices  Will Catheter: Not present  Central Lines: None  Cardiac Monitoring: None  Code Status: Full Code        Disposition Plan   Anticipate transfer to station 3B when bed become available      Jennifer Thurston MD  Hospitalist Service, GOLD TEAM [address] [place] [address]  Securely message with the Vocera Web Console (learn more here)  Text page via [employer] Paging/Directory   Please see signed in provider for up to date coverage  information    ______________________________________________________________________    Interval History      Cough, wheezing and stridor better. Still depressed.  Awaiting for transfer to inpatient psychiatry unit.    Data reviewed today: I reviewed all medications, new labs and imaging results over the last 24 hours.     Physical Exam   Vital Signs: Temp: 98.6  F (37  C) Temp src: Oral BP: (!) 152/68 Pulse: 75   Resp: 18 SpO2: (!) 89 % O2 Device: None (Room air)    Weight: 238 lbs 6.4 oz  General: Morbidly obese, aao x 3, NAD.  HEENT:  NC/AT, PERRL, EOMI, neck supple, no thyromegaly, op clear, mmm.  CVS:  NL s 1 and s2, no m/r/g.  Lungs: Decreased BS, + stridor and and expiratory wheezing   Abd:  Soft, + bs, NT, no rebound or gaurding, no fluid shift.  Ext:  No c/c.  Lymph:  No edema.  Neuro:  Nonfocal.  Musculoskeletal: No calf tenderness to palpation.    Skin:  No rash.  Psychiatry:  Flat affect and depressed mood      Data   Recent Labs   Lab 07/04/22  0730 07/02/22  0638 07/01/22  0550 06/30/22  0554 06/29/22  1119 06/28/22  1608   WBC  --   --   --  5.2  --  6.3   HGB  --   --   --  9.9*  --  11.8   MCV  --   --   --  103*  --  97   PLT  --   --   --  105*  --  92*    134 132* 132*   < > 126*   POTASSIUM 3.5 3.6 3.5 3.9   < > 3.9   CHLORIDE 102 102 101 100   < > 92*   CO2 26 22 22 23   < > 21   BUN 8 11 11 10   < > 8   CR 0.56 0.90  0.92 1.51* 1.54*   < > 0.64   ANIONGAP 9 10 9 9   < > 13   YOSEPH 8.5 8.2* 7.4* 7.7*   < > 8.2*   GLC 97 99 108* 116*   < > 97   ALBUMIN 2.7*  --   --  2.3*   < > 2.8*   PROTTOTAL 6.8  --   --  6.0*   < > 7.2   BILITOTAL 0.8  --   --  0.8   < > 1.0   ALKPHOS 178*  --   --  150   < > 174*   *  --   --  119*   < > 164*   *  --   --  155*   < > 254*   LIPASE  --   --   --  642*  --  606*    < > = values in this interval not displayed.     No results found for this or any previous visit (from the past 24 hour(s)).  Medications       acetylcysteine  4 mL Nebulization  4x Daily     benzonatate  100 mg Oral TID     cefTRIAXone  2 g Intravenous Q24H     cloNIDine  0.1 mg Oral Q8H     doxycycline hyclate  100 mg Oral Q12H RANJANA (08/20)     DULoxetine  30 mg Oral Daily     folic acid  1 mg Oral Daily     furosemide  20 mg Oral Daily     losartan  100 mg Oral Daily     multivitamin w/minerals  1 tablet Oral Daily     pantoprazole  40 mg Oral QAM AC     predniSONE  40 mg Oral Daily     racEPINEPHrine  0.5 mL Nebulization Once     sodium chloride   Intravenous Once     thiamine  100 mg Oral Daily

## 2022-07-05 NOTE — PLAN OF CARE
"  VS: /64 (BP Location: Left arm, Patient Position: Semi-Solorzano's, Cuff Size: Adult Regular)   Pulse 78   Temp 98.2  F (36.8  C) (Oral)   Resp 16   Ht 1.626 m (5' 4\")   Wt 108.1 kg (238 lb 6.4 oz)   SpO2 92%   BMI 40.92 kg/m     O2: 92% on RA, denies SOB or respiratory distress    Output: Voiding adequate amounts with use of toilet    Last BM: 7/5 per pt report    Activity: SBA with walker    Up for meals? no   Skin: Bruises to right and left elbow    Pain: Denies pain or discomfort    CMS: Alert & oriented, denies numbness or tingling    Dressing: None    Diet: Regular, appetite 100% for dinner. Fluid restriction 2000 cc/day, total intake 120 ml from 3-7 PM     LDA: Left hand PIV, saline locked    Equipment: Walker and personal belongings    Plan: Will continue plan of care   Additional Info: MORAIMA 1 @ 0105                            "

## 2022-07-05 NOTE — PLAN OF CARE
Pt up with SBA and walker. Up to bathroom frequently this morning. CMS intact. CIWA scores low. Nausea this morning with repeated dry heaving. Zofran given x1. PIV saline locked between meds. Mg replaced with recheck this afternoon. Waiting for psych consult. Pt stated she does NOT want to go to inpatient psych. VSS. PT able to make needs known.

## 2022-07-05 NOTE — TELEPHONE ENCOUNTER
R: The pt is currently on [place] 8A Med Surg awaiting a medical to behavioral transfer.      Intake Morning Bed Search (Done at 8:00a- metro placement)  [facility] is posting 0 beds.   [facility] is posting 0 beds.  [facility] is posting 0 beds.  [facility] is posting 0 beds.  [facility] is posting 0 beds.  [facility] is posting 0 beds.  [facility] is posting 0 beds.  [facility] is posting 0 beds.    8:09a [facility] at capacity. The pt remains on the waitlist. Intake continues to identify appropriate bed placement.     2:45p Intake called the provider to present for Unit 10 (Mariaa). Provider accepts the pt- pt due to discharge at 5p. Pt can admit after discharge has taken place.     3:00pm Intake called charge RN Unit 10 to go over admission in que. Unit does not have a bed available. Room available is needed to accomodate a room change on the unit.     3:07p Intake paged provider to present for Unit 20 (Brody ERAZO/ Antolin CAMPOVERDE).     3:16p Provider returns Intake's page- provider declines the pt due to the unit's acuity and the pt's vulnerability on the unit given the current census.     3:20p [facility] at capacity. The pt remains on the waitlist. Intake continues to identify appropriate bed placement.     Intake Afternoon Bed Search (Done at 3:21p- metro placement)  [facility] is posting 0 beds.   [facility] is posting 0 beds.  [facility] is posting 0 beds.  [facility] is posting 0 beds.  [facility] is posting 0 beds.  [facility] is posting 0 beds.  [facility] is posting 0 beds.  [facility] is posting 0 beds.    3:25p Intake paged provider to present for Unit 30 (Alec).    3:28p Provider returned Intake's page- provider to doc-to-doc and call Intake back with an admission decision.     3:53p Provider called Intake- provider tried to do a doc-to-doc, provider listed is not correct. Intake to connect with the medical unit for provider information.     3:53p Intake called [address]  Med Surg- provider on service is Dr. Thurston [phone].     3:56p Intake gave  correct provider and contact information for the pt's doc-to-doc. Intake awaiting follow-up.      4:14p Dr. Michael returned call to Intake- pt is not endorsing SI/VH/AH. Provider declines the pt. Dr. Thurston is going to order a reassessment for the pt to determine IP MH needs. The work-list has been updated.Intake awaiting re-assessment.

## 2022-07-06 ENCOUNTER — TELEPHONE (OUTPATIENT)
Dept: BEHAVIORAL HEALTH | Facility: CLINIC | Age: 72
End: 2022-07-06

## 2022-07-06 LAB
CREAT SERPL-MCNC: 0.53 MG/DL (ref 0.52–1.04)
GFR SERPL CREATININE-BSD FRML MDRD: >90 ML/MIN/1.73M2
HOLD SPECIMEN: NORMAL
MAGNESIUM SERPL-MCNC: 1.3 MG/DL (ref 1.6–2.3)
MAGNESIUM SERPL-MCNC: 1.4 MG/DL (ref 1.6–2.3)
MAGNESIUM SERPL-MCNC: 2 MG/DL (ref 1.6–2.3)
PHOSPHATE SERPL-MCNC: 4.2 MG/DL (ref 2.5–4.5)

## 2022-07-06 PROCEDURE — 82565 ASSAY OF CREATININE: CPT | Performed by: HOSPITALIST

## 2022-07-06 PROCEDURE — 83735 ASSAY OF MAGNESIUM: CPT | Performed by: HOSPITALIST

## 2022-07-06 PROCEDURE — 36415 COLL VENOUS BLD VENIPUNCTURE: CPT | Performed by: HOSPITALIST

## 2022-07-06 PROCEDURE — 36415 COLL VENOUS BLD VENIPUNCTURE: CPT | Performed by: PEDIATRICS

## 2022-07-06 PROCEDURE — 250N000013 HC RX MED GY IP 250 OP 250 PS 637: Performed by: INTERNAL MEDICINE

## 2022-07-06 PROCEDURE — 83735 ASSAY OF MAGNESIUM: CPT | Performed by: INTERNAL MEDICINE

## 2022-07-06 PROCEDURE — 250N000011 HC RX IP 250 OP 636: Performed by: INTERNAL MEDICINE

## 2022-07-06 PROCEDURE — 99232 SBSQ HOSP IP/OBS MODERATE 35: CPT | Performed by: HOSPITALIST

## 2022-07-06 PROCEDURE — 120N000002 HC R&B MED SURG/OB UMMC

## 2022-07-06 PROCEDURE — 250N000013 HC RX MED GY IP 250 OP 250 PS 637: Performed by: EMERGENCY MEDICINE

## 2022-07-06 PROCEDURE — 83735 ASSAY OF MAGNESIUM: CPT | Performed by: PEDIATRICS

## 2022-07-06 PROCEDURE — 36415 COLL VENOUS BLD VENIPUNCTURE: CPT | Performed by: INTERNAL MEDICINE

## 2022-07-06 PROCEDURE — 84100 ASSAY OF PHOSPHORUS: CPT | Performed by: INTERNAL MEDICINE

## 2022-07-06 PROCEDURE — 250N000013 HC RX MED GY IP 250 OP 250 PS 637: Performed by: HOSPITALIST

## 2022-07-06 PROCEDURE — 250N000012 HC RX MED GY IP 250 OP 636 PS 637: Performed by: INTERNAL MEDICINE

## 2022-07-06 PROCEDURE — 250N000013 HC RX MED GY IP 250 OP 250 PS 637: Performed by: PSYCHIATRY & NEUROLOGY

## 2022-07-06 PROCEDURE — 99231 SBSQ HOSP IP/OBS SF/LOW 25: CPT | Performed by: PSYCHIATRY & NEUROLOGY

## 2022-07-06 PROCEDURE — 250N000011 HC RX IP 250 OP 636: Performed by: HOSPITALIST

## 2022-07-06 RX ORDER — DULOXETIN HYDROCHLORIDE 60 MG/1
60 CAPSULE, DELAYED RELEASE ORAL DAILY
Status: DISCONTINUED | OUTPATIENT
Start: 2022-07-07 | End: 2022-07-07 | Stop reason: HOSPADM

## 2022-07-06 RX ORDER — MAGNESIUM SULFATE HEPTAHYDRATE 40 MG/ML
4 INJECTION, SOLUTION INTRAVENOUS ONCE
Status: COMPLETED | OUTPATIENT
Start: 2022-07-06 | End: 2022-07-06

## 2022-07-06 RX ADMIN — GUAIFENESIN 600 MG: 600 TABLET, EXTENDED RELEASE ORAL at 07:52

## 2022-07-06 RX ADMIN — DULOXETINE HYDROCHLORIDE 30 MG: 30 CAPSULE, DELAYED RELEASE ORAL at 07:52

## 2022-07-06 RX ADMIN — BENZONATATE 100 MG: 100 CAPSULE ORAL at 13:28

## 2022-07-06 RX ADMIN — FUROSEMIDE 20 MG: 20 TABLET ORAL at 07:52

## 2022-07-06 RX ADMIN — MAGNESIUM SULFATE HEPTAHYDRATE 4 G: 40 INJECTION, SOLUTION INTRAVENOUS at 11:40

## 2022-07-06 RX ADMIN — HYDRALAZINE HYDROCHLORIDE 10 MG: 20 INJECTION INTRAMUSCULAR; INTRAVENOUS at 18:11

## 2022-07-06 RX ADMIN — HYDRALAZINE HYDROCHLORIDE 10 MG: 20 INJECTION INTRAMUSCULAR; INTRAVENOUS at 00:19

## 2022-07-06 RX ADMIN — BENZONATATE 100 MG: 100 CAPSULE ORAL at 07:52

## 2022-07-06 RX ADMIN — PREDNISONE 40 MG: 20 TABLET ORAL at 07:52

## 2022-07-06 RX ADMIN — THIAMINE HCL TAB 100 MG 100 MG: 100 TAB at 07:52

## 2022-07-06 RX ADMIN — PANTOPRAZOLE SODIUM 40 MG: 40 TABLET, DELAYED RELEASE ORAL at 07:52

## 2022-07-06 RX ADMIN — FOLIC ACID 1 MG: 1 TABLET ORAL at 07:57

## 2022-07-06 RX ADMIN — LOSARTAN POTASSIUM 100 MG: 100 TABLET, FILM COATED ORAL at 07:52

## 2022-07-06 RX ADMIN — DOXYCYCLINE 100 MG: 100 CAPSULE ORAL at 19:53

## 2022-07-06 RX ADMIN — GUAIFENESIN 600 MG: 600 TABLET, EXTENDED RELEASE ORAL at 19:53

## 2022-07-06 RX ADMIN — BENZONATATE 100 MG: 100 CAPSULE ORAL at 19:53

## 2022-07-06 RX ADMIN — MULTIPLE VITAMINS W/ MINERALS TAB 1 TABLET: TAB at 07:52

## 2022-07-06 RX ADMIN — CEFTRIAXONE SODIUM 2 G: 2 INJECTION, POWDER, FOR SOLUTION INTRAMUSCULAR; INTRAVENOUS at 13:28

## 2022-07-06 RX ADMIN — DOXYCYCLINE 100 MG: 100 CAPSULE ORAL at 07:56

## 2022-07-06 ASSESSMENT — ACTIVITIES OF DAILY LIVING (ADL)
ADLS_ACUITY_SCORE: 33

## 2022-07-06 NOTE — PLAN OF CARE
BP elevated.Asymptomatic.IV hydralazine given.Rechecked at 147/54 from 169/74.L hand PIV SL.Satting at 91-95% on room air.  LS clearing up.Denies SOB.Less coughing noted.  Irritated with bed alarm.Up steady on her feet.Using walker to BR.Voiding spontaneously.Is passing gas.LBM yesterday per pt.  Recheck for magnesium/phosphorus this morning.  Plan to discharge to [facility] psyche.

## 2022-07-06 NOTE — CONSULTS
"  PSYCHIATRIC CONSULTATION, follow-up    Admission Date: 06/28/2022  Date of Service: 07/06/2022    The patient was seen, her chart reviewed, her case discussed with staff.  She seems to have tolerated recent medication change without any problems. No side effects or adverse reactions from Cymbalta which she noted to be helpful as her depression started to dissipate.  The patient does not want to be transferred to Inpatient Psychiatry and wants to go home. She no longer has any signs of alcohol withdrawal. She has been sleeping well at night and has not been utilizing PRN Melatonin    This is a 72-year-old white, , mother of 2 with about 40-year history of depression and alcohol abuse, who was brought to the hospital by her daughter because of increasing symptoms of depression, lack of appetite, and continued alcohol abuse. The patient also has been suffering a lot of pain associated with her polyarthritis. Although she denied suicidal thoughts, she was quoted as saying that \"I would like to just go to sleep.\" The patient was transferred to the medical floor on [facility] protocol with Valium and both chemical dependency consult and psychiatric consult were ordered. It should be noted that the patient was seen by CHLOE Ayala, for a chemical dependency evaluation. Reportedly, the patient expressed no interest in a chemical dependency treatment. I saw the patient for initial consultion 06/29/22 and replaced her PTA Prozac and Wellbutrin as ineffective with Cymbalta.    MEDICATIONS, psychotropic  Cymbalta 30 mg QAM  Valium PRN as per [facility] protocol  Melatonin 5 mg HS PRN    LABS: Creatinine and GFR were WNL today, Magnesium was slightly decreased at 1.3 mg/dL    MENTAL STATUS EXAMINATION  Revealed a normally built and normally developed, obese, generally pleasant, friendly, and cooperative with the interview, 72-year-old white female, appearing about her stated age. She was alert and oriented x 3. Her " speech was coherent, logical, and goal-directed. She was more animated and spontaneous and appeared to be less depressed. She adamantly denied suicidal ideation or intent. No overt psychotic features were noted or elicited. She showed no signs of alcohol withdrawal.  She had some insight into her problems and was actively seeking help. Her judgment did not seem to be significantly impaired.     DIAGNOSTIC IMPRESSION:   1. Depression, not otherwise specified.   2. Alcohol use disorder.   3. Chronic pain syndrome.     PLAN: I will further increase Cymbalta to 60 mg QAM and discontinue CIWA protocol. Valium will also be discontinued. The patient may be safely discharged from the psychiatric standpoint. The patient was strongly advised to totally abstain from alcohol. I will have  make a referral for outpatient psychiatric follow-up.    Thanks,    Juan Carlos Moreno MD

## 2022-07-06 NOTE — PLAN OF CARE
"    VS: /65(BP Location: Left arm, Patient Position: Semi-Solorzano's, Cuff Size: Adult Regular)   Pulse 67 Temp 98. F (Oral)   Resp 18  Ht 1.626 m (5' 4\")   Wt 108.1 kg (238 lb 6.4 oz)   SpO2 96%   BMI 40.92 kg/m     O2: 92% on RA, denies SOB or respiratory distress    Output: Voiding adequate amounts with use of toilet    Last BM: 7/5 per pt report    Activity: SBA with walker    Up for meals? no   Skin: Bruises to right and left elbow    Pain: Denies pain or discomfort    CMS: Alert & oriented, denies numbness or tingling    Dressing: None    Diet: Regular diet with good appetite Fluid restriction 2000 ml/day    LDA: Left hand PIV, saline locked    Equipment: Walker and personal belongings    Plan: Will continue plan of care   Additional Info: MORAIMA 1 @ 0800 Magnesium replaced. Next draw at 1800.                                    Goal Outcome Evaluation: ongoing     Plan of Care Reviewed With: patient     Overall Patient Progress: improving           "

## 2022-07-06 NOTE — TELEPHONE ENCOUNTER
R: The pt is currently on [place] 8A Med Surg awaiting a medical to behavioral transfer.      8:04a Intake reviewed chart for re-assessment. The pt has not been re-assessed by psych.Orders have been placed for a psychiatry re-assessment 7/5/2022.  Intake awaiting re-assessment.     3:17p Intake reviewed the pt's chart for re-assessment. Per the psych consult note today at 2:20p- The pt is being discharged w/ outpatient supportive services. Intake updated the work-list. Intake no longer seeking active placement.

## 2022-07-06 NOTE — PROGRESS NOTES
[facility]    Medicine Progress Note - Hospitalist Service, GOLD TEAM 16    Date of Admission:  6/28/2022    Assessment & Plan           Juani Hernandez is a 71 yo female w/ h/o depression, alcohol abuse, HTN, HLD, GERD, osteopenia, spondylolisthesis of the lumbar region with prior lumbar fusion, DJD and chronic pain.  She states she drinks 2 drinks of whiskey daily.  She says she drinks whiskey daily so she does not exhibit withdrawal symptoms.  History is negative for alcohol withdrawal seizures or DTs.  She was brought to [facility] ED by her daughter for evaluation of increased depression, poor appetite, decreased oral intake.  History is negative for suicidal or homicidal ideation.  Evaluation in the ED on 6/28/22 revealed BAL 0.08 g/dl, Na 126 and elevated transaminase.  Admitted to hospital service for detoxification and treatment of hyponatremia.      Alcohol abuse  Alcohol intoxication    ---   BAL was 0.08 g/dL  ---   Urine tox screen negative  ---   Completed detox  ---   D/c'd gabapentin taper on 6/30/22   ---   Discontinue Valium, has not required it for the past 4 days  ---   Continue clonidine for adrenergic hyperactivity symptoms  ---   Continue MIVF, MVI, folate and thiamine supplements    Severe depression  ---   Has flat affect and depressed mood  ---   She says her PTA meds do not help  ---   Seen by psychiatry consult service on 6/29/22 who discontinued patient's PTA fluoxetine and bupropion  ---   She was initiated on Cymbalta 30 mg daily on 6/29/22 with plan to titrate up  ---   Transfer to inpatient psychiatry unit, preferably station 3B when bed become available  ---   She is medically stable for transfer to station 3B but beds still are not available.  Patient feels more depressed and requesting to talk to psychiatrist once more.  Psychiatry consult service not available till Tuesday, 7/5/22.  Will reconsult psychiatry service to assess patient  if she needs to be admitted or if this can be managed as out pt.     Hypoxia with upper airway stridor and bronchospasm  ---   She was transiently hypoxic on 6/29/22  ---   On RA since AM of 6/30/22  ---   Audible stridor and wheezing better today   ---   Nonproductive cough is also better today  ---   Never smoker but remotely exposed to secondhand smoke   ---   CXR negative for infiltrates,, pulmonary edema or pleural effusions  ---   Urine Legionella and Streptococcus Ags ordered but not collected  ---   Continue prednisone 40 mg daily, stop date 7/6/22  ---   Start Mucinex 600 mg ER BID for 4 days  ---   Continue Tessalon Perles 200 mg tid, Robitussin with codeine 5 cc q4 hr prn  ---   Continue doxycycline 100 mg bid and ceftriaxone 2 g daily (started on 7/2/22) for probable acute bronchitis with bronchospasm and stridor.  Stop both antibiotics on 7/6/22  ---   Continue DuoNeb PRN  ---   Epinephrine x1 for upper airway stridor 7/3/22  ---   Mucomyst neb ordered, but not wanting to use it. We will stop it. And start mucinex tabs.     Probable UTI  ---   Patient without urinary symptoms  ---   UA at admit was grossly negative for infection but urine culture grew > 100k colonies of pansensitive E. Coli  ---   Patient with generalized weakness.  ---  Continue ceftriaxone 2 g IV daily x 5 days; 7/2 - 7/6/22    MERON  ---   Suspect prerenal  ---   Admit creatinine was 0.8 ---> IVF ---> 1.12 ---> IVF ---> 1.54 --> 1.51 ---> IVF bolus ---> creatinine 0.92 ---> 0.56 today  ---   MERON resolved  ---   Discontinued IVF  ---   Reintroduce PTA losartan and Lasix today  ---   Avoid nephrotoxins    Alcoholic hepatitis  ---   AST/ALT ratio c/w alcoholic liver disease  ---   Abdominal ultrasound earlier this morning revealed steatosis  ---   Abstinence from alcohol strongly recommended    Severe hyponatremia  ---   Pt drinks whiskey and not beer thus less likely due to beer potomania  ---   Na level was 126 admit  ---   Serum  osmolality 273 (low)  ---   Urine sodium is 37  ---   Urine osmolality 268 (low)  ---   Hyponatremia was probably due to volume depletion  ---   Na was up to 137, 6 days after admission  ---   IVF discontinued    HTN  ---   Well-controlled  ---   Reintroduce PTA losartan and Lasix   ---   Continue as needed IV hydralazine    HLD  ---   PTA simvastatin on hold for transaminitis    Hypomagnesemia  ---   Mg level was 1.1   ---   On replacement protocol    Elevated lipase at 606 ---> 206  ---   Denied epigastric pain radiating to her back  ---   Denied nausea or vomiting this morning  ---   No evidence of acute pancreatitis   ---   Lipase is back to normal range    Thrombocytopenia  ---   Platelet count is 92k ---> 105k on 6/30/22  ---   Due to alcohol affecting bone marrow and possibly platelet sequestration in the spleen  ---   No evidence of bleeding  ---   Abstinence from alcohol strongly recommended    Severe obesity with BMI of 40.92 kg/m2  ---   Weight loss strongly recommended    Hypophosphatemia  ---   Replace per protocol    Generalized weakness  ---   PT consulted    Diarrhea  ---   Onset prior to admission  ---   Stool C. diff toxins and enteric pathogens negative on 6/30/22  ---   C/w Imodium as needed    Generalized weakness  ---   PT/OT consulted    Universal COVID-19 screening  ---   Fully vaccinated but not boosted  ---   SARS-CoV-2 PCR negative on 6/28/2022    Diet: Fluid restriction 2000 ML FLUID  Regular Diet Adult    DVT Prophylaxis: Pneumatic Compression Devices  Will Catheter: Not present  Central Lines: None  Cardiac Monitoring: None  Code Status: Full Code        Disposition Plan   Anticipate transfer to station 3B when bed become available      Jennifer Thurston MD  Hospitalist Service, GOLD TEAM [address] [place] [address]  Securely message with the Vocera Web Console (learn more here)  Text page via [employer] Paging/Directory   Please see signed in provider for  up to date coverage information    ______________________________________________________________________    Interval History      Cough, wheezing and stridor better. Still depressed.  Awaiting for transfer to inpatient psychiatry unit.    Data reviewed today: I reviewed all medications, new labs and imaging results over the last 24 hours.     Physical Exam   Vital Signs: Temp: 98  F (36.7  C) Temp src: Oral BP: (!) 143/44 Pulse: 67   Resp: 18 SpO2: 96 % O2 Device: None (Room air)    Weight: 238 lbs 0 oz  General: Morbidly obese, aao x 3, NAD.  HEENT:  NC/AT, PERRL, EOMI, neck supple, no thyromegaly, op clear, mmm.  CVS:  NL s 1 and s2, no m/r/g.  Lungs: Decreased BS, + stridor and and expiratory wheezing   Abd:  Soft, + bs, NT, no rebound or gaurding, no fluid shift.  Ext:  No c/c.  Lymph:  No edema.  Neuro:  Nonfocal.  Musculoskeletal: No calf tenderness to palpation.    Skin:  No rash.  Psychiatry:  Flat affect and depressed mood      Data   Recent Labs   Lab 07/06/22  0730 07/04/22  0730 07/02/22  0638 07/01/22  0550 06/30/22  0554   WBC  --   --   --   --  5.2   HGB  --   --   --   --  9.9*   MCV  --   --   --   --  103*   PLT  --   --   --   --  105*   NA  --  137 134 132* 132*   POTASSIUM  --  3.5 3.6 3.5 3.9   CHLORIDE  --  102 102 101 100   CO2  --  26 22 22 23   BUN  --  8 11 11 10   CR 0.53 0.56 0.90  0.92 1.51* 1.54*   ANIONGAP  --  9 10 9 9   YOSEPH  --  8.5 8.2* 7.4* 7.7*   GLC  --  97 99 108* 116*   ALBUMIN  --  2.7*  --   --  2.3*   PROTTOTAL  --  6.8  --   --  6.0*   BILITOTAL  --  0.8  --   --  0.8   ALKPHOS  --  178*  --   --  150   ALT  --  157*  --   --  119*   AST  --  229*  --   --  155*   LIPASE  --   --   --   --  642*     No results found for this or any previous visit (from the past 24 hour(s)).  Medications       benzonatate  100 mg Oral TID     cefTRIAXone  2 g Intravenous Q24H     doxycycline hyclate  100 mg Oral Q12H [facility] (08/20)     DULoxetine  30 mg Oral Daily     folic acid  1 mg Oral  Daily     furosemide  20 mg Oral Daily     guaiFENesin  600 mg Oral BID     losartan  100 mg Oral Daily     magnesium sulfate  4 g Intravenous Once     multivitamin w/minerals  1 tablet Oral Daily     pantoprazole  40 mg Oral QAM AC     racEPINEPHrine  0.5 mL Nebulization Once     sodium chloride   Intravenous Once     thiamine  100 mg Oral Daily

## 2022-07-07 VITALS
HEART RATE: 82 BPM | SYSTOLIC BLOOD PRESSURE: 148 MMHG | RESPIRATION RATE: 18 BRPM | TEMPERATURE: 98.6 F | BODY MASS INDEX: 40.63 KG/M2 | HEIGHT: 64 IN | DIASTOLIC BLOOD PRESSURE: 74 MMHG | WEIGHT: 238 LBS | OXYGEN SATURATION: 93 %

## 2022-07-07 LAB
MAGNESIUM SERPL-MCNC: 1.6 MG/DL (ref 1.6–2.3)
PHOSPHATE SERPL-MCNC: 3.2 MG/DL (ref 2.5–4.5)

## 2022-07-07 PROCEDURE — 250N000013 HC RX MED GY IP 250 OP 250 PS 637: Performed by: EMERGENCY MEDICINE

## 2022-07-07 PROCEDURE — 250N000013 HC RX MED GY IP 250 OP 250 PS 637: Performed by: INTERNAL MEDICINE

## 2022-07-07 PROCEDURE — 250N000013 HC RX MED GY IP 250 OP 250 PS 637: Performed by: HOSPITALIST

## 2022-07-07 PROCEDURE — 250N000013 HC RX MED GY IP 250 OP 250 PS 637: Performed by: PSYCHIATRY & NEUROLOGY

## 2022-07-07 PROCEDURE — 36415 COLL VENOUS BLD VENIPUNCTURE: CPT | Performed by: HOSPITALIST

## 2022-07-07 PROCEDURE — 84100 ASSAY OF PHOSPHORUS: CPT | Performed by: HOSPITALIST

## 2022-07-07 PROCEDURE — 99239 HOSP IP/OBS DSCHRG MGMT >30: CPT | Performed by: HOSPITALIST

## 2022-07-07 PROCEDURE — 83735 ASSAY OF MAGNESIUM: CPT | Performed by: HOSPITALIST

## 2022-07-07 RX ORDER — MULTIPLE VITAMINS W/ MINERALS TAB 9MG-400MCG
1 TAB ORAL DAILY
Qty: 30 TABLET | Refills: 0 | Status: SHIPPED | OUTPATIENT
Start: 2022-07-08 | End: 2022-08-07

## 2022-07-07 RX ORDER — DULOXETIN HYDROCHLORIDE 60 MG/1
60 CAPSULE, DELAYED RELEASE ORAL DAILY
Qty: 30 CAPSULE | Refills: 0 | Status: SHIPPED | OUTPATIENT
Start: 2022-07-08 | End: 2022-07-22

## 2022-07-07 RX ORDER — LANOLIN ALCOHOL/MO/W.PET/CERES
100 CREAM (GRAM) TOPICAL DAILY
Qty: 30 TABLET | Refills: 0 | Status: SHIPPED | OUTPATIENT
Start: 2022-07-08 | End: 2022-07-24

## 2022-07-07 RX ORDER — FOLIC ACID 1 MG/1
1 TABLET ORAL DAILY
Qty: 30 TABLET | Refills: 0 | Status: SHIPPED | OUTPATIENT
Start: 2022-07-08 | End: 2022-07-24

## 2022-07-07 RX ADMIN — FUROSEMIDE 20 MG: 20 TABLET ORAL at 08:42

## 2022-07-07 RX ADMIN — PANTOPRAZOLE SODIUM 40 MG: 40 TABLET, DELAYED RELEASE ORAL at 08:42

## 2022-07-07 RX ADMIN — THIAMINE HCL TAB 100 MG 100 MG: 100 TAB at 08:42

## 2022-07-07 RX ADMIN — MULTIPLE VITAMINS W/ MINERALS TAB 1 TABLET: TAB at 08:42

## 2022-07-07 RX ADMIN — BENZONATATE 100 MG: 100 CAPSULE ORAL at 08:42

## 2022-07-07 RX ADMIN — GUAIFENESIN 600 MG: 600 TABLET, EXTENDED RELEASE ORAL at 08:42

## 2022-07-07 RX ADMIN — LOSARTAN POTASSIUM 100 MG: 100 TABLET, FILM COATED ORAL at 08:42

## 2022-07-07 RX ADMIN — DULOXETINE HYDROCHLORIDE 60 MG: 60 CAPSULE, DELAYED RELEASE ORAL at 08:42

## 2022-07-07 RX ADMIN — FOLIC ACID 1 MG: 1 TABLET ORAL at 08:42

## 2022-07-07 ASSESSMENT — ACTIVITIES OF DAILY LIVING (ADL)
ADLS_ACUITY_SCORE: 33

## 2022-07-07 NOTE — PLAN OF CARE
"/50 (BP Location: Left arm, Patient Position: Semi-Solorzano's, Cuff Size: Adult Large)   Pulse 68   Temp 97.2  F (36.2  C) (Oral)   Resp 18   Ht 1.626 m (5' 4\")   Wt 108 kg (238 lb)   SpO2 97%   BMI 40.85 kg/m  .     A&O x 4. VSS ex hypertensive - Provider updated, prn hydralazine administered x 1.   Denies N/T. Denies N/V.   LS clear. Denies SOB. Denies chest pain. Currently on RA.  Last BM on 7/5. Voids without difficulty.  Per PT/OT, pt okay to be independent in room. BA discontinued during AM. Gait steady.  Skin intact.  L PIV - patent, saline locked.  Denies pain.  Regular diet. 2000 mL fluid restriction.    Pts daughter (Kadie/primary contact) would like a call from the provider on 7/7 prior to pt discharge. RN will pass on to following shift. Sticky note wrote.    Continue POC. Planned discharge on 7/7.  "

## 2022-07-07 NOTE — PROGRESS NOTES
"Pt slept most of the noc and denies pain or discomfort. SPO2>90% on room air. Denies SI or hallucinations. Pt was seen by her Psychiatrist and adjustment made to her Cymbalta orders. Pt is tentatively recommended for discharge to home vs in-patient psych placement as pt is not in agreement for psych placement. Pt will need an out-pt psych follow-up. Pt no longer on CIWA monitoring; on regular diet with 2000 mL fluids restrictions. Magnesium and Phosphorus lab orders placed for draw this AM per K-mg protocols monitoring,. VSS and resting comfortably in bed at this time.     /55 (BP Location: Right arm, Patient Position: Left side, Cuff Size: Adult Large)   Pulse 72   Temp 97.8  F (36.6  C) (Oral)   Resp 20   Ht 1.626 m (5' 4\")   Wt 108 kg (238 lb)   SpO2 95%   BMI 40.85 kg/m        Yuan MALAGON RN  "

## 2022-07-07 NOTE — DISCHARGE SUMMARY
[facility]  Discharge Summary    Juani Hernandez MRN# [id number]   YOB: 1950 Age: 72 year old     Date of Admission:  6/28/2022  Date of Discharge:  7/7/2022  Admitting Physician:  Joesph Yoder MD  Discharge Physician:  Jennifer Thurston MD  Discharging Service:  Internal Medicine     Primary Provider: No Ref-Primary, Physician            Discharge Diagnosis:     Primary Discharge Diagnosis:    Alcohol abuse  Alcohol intoxication   Severe depression  Hypoxia with upper airway stridor and bronchospasm   Probable UTI  MERON  Alcoholic hepatitis  Severe hyponatremia  HTN  HLD  Hypomagnesemia  Elevated lipase at 606 => 206  Thrombocytopenia  Severe obesity with BMI of 40.92 kg/m2  Hypophosphatemia  Generalized weakness  Diarrhea  Generalized weakness               Discharge Medications:     Current Discharge Medication List      START taking these medications    Details   DULoxetine (CYMBALTA) 60 MG capsule Take 1 capsule (60 mg) by mouth daily for 30 days  Qty: 30 capsule, Refills: 0    Associated Diagnoses: Depression, unspecified depression type      folic acid (FOLVITE) 1 MG tablet Take 1 tablet (1 mg) by mouth daily for 30 days  Qty: 30 tablet, Refills: 0    Associated Diagnoses: Alcohol withdrawal syndrome without complication (H)      multivitamin w/minerals (THERA-VIT-M) tablet Take 1 tablet by mouth daily for 30 days  Qty: 30 tablet, Refills: 0    Associated Diagnoses: Alcohol withdrawal syndrome without complication (H)      thiamine (B-1) 100 MG tablet Take 1 tablet (100 mg) by mouth daily for 30 days  Qty: 30 tablet, Refills: 0    Associated Diagnoses: Alcohol withdrawal syndrome without complication (H)         CONTINUE these medications which have NOT CHANGED    Details   furosemide (LASIX) 20 MG tablet 20 mg daily      losartan (COZAAR) 100 MG tablet 100 mg daily      NYAMYC 308435 UNIT/GM external powder       potassium chloride ER (KLOR-CON M) 20 MEQ CR tablet 20  mEq daily      simvastatin (ZOCOR) 20 MG tablet 20 mg At Bedtime      triamcinolone (KENALOG) 0.5 % external ointment       VENTOLIN  (90 Base) MCG/ACT inhaler INHALE 2 PUFFS INTO THE LUNGS EVERY 4 HOURS AS NEEDED SHORTNESS OF BREATH. SHAKE BEFORE USING         STOP taking these medications       buPROPion (WELLBUTRIN XL) 300 MG 24 hr tablet Comments:   Reason for Stopping:         FLUoxetine (PROZAC) 20 MG capsule Comments:   Reason for Stopping:                    Hospital Course:     Juani Hernandez is a 73 yo female w/ h/o depression, alcohol abuse, HTN, HLD, GERD, osteopenia, spondylolisthesis of the lumbar region with prior lumbar fusion, DJD and chronic pain.  She states she drinks 2 drinks of whiskey daily.  She says she drinks whiskey daily so she does not exhibit withdrawal symptoms.  History is negative for alcohol withdrawal seizures or DTs.  She was brought to [facility] ED by her daughter for evaluation of increased depression, poor appetite, decreased oral intake.  History is negative for suicidal or homicidal ideation.  Evaluation in the ED on 6/28/22 revealed BAL 0.08 g/dl, Na 126 and elevated transaminase.  Admitted to hospital service for detoxification and treatment of hyponatremia.       Alcohol abuse  Alcohol intoxication     ---   BAL was 0.08 g/dL  ---   Urine tox screen negative  ---   Completed detox  ---   D/c'd gabapentin taper on 6/30/22   ---   Discontinue Valium, has not required it for the past 4 days  ---   off  clonidine for adrenergic hyperactivity symptoms  ---   Continue MIVF, MVI, folate and thiamine supplements  ---   Seen by CD. Pt does not want to be gonzalez     Severe depression  ---   Has flat affect and depressed mood  ---   She says her PTA meds do not help  ---   Seen by psychiatry consult service on 6/29/22 who discontinued patient's PTA fluoxetine and bupropion  ---   She was initiated on Cymbalta 30 mg daily on 6/29/22 with plan to titrate up. Seen by psychiatry at  Follow-up. They upped the dose of Cymbalta to 60 mg every day.   ---   Psychiatry does not feel that pt needs to be hospitalized to inpatient unit and also the patient does not want to be admitted to the mental health unit  ---   Follow-up with Out pt psychiatry       Hypoxia with upper airway stridor and bronchospasm    ---   She was transiently hypoxic on 6/29/22  ---   On RA since AM of 6/30/22  ---   Audible stridor and wheezing better today   ---   Nonproductive cough is also better today  ---   Never smoker but remotely exposed to secondhand smoke   ---   CXR negative for infiltrates,, pulmonary edema or pleural effusions  ---   Urine Legionella and Streptococcus Ags ordered but not collected  ---   Was treated with prednisone 40 mg daily, off prednisone on date 7/6/22  ---   Start Mucinex 600 mg ER BID for 4 days  ---   SP Tessalon Perles 200 mg tid, Robitussin with codeine 5 cc q4 hr prn  ---   SP doxycycline 100 mg bid and ceftriaxone 2 g daily (started on 7/2/22) for probable acute bronchitis with bronchospasm and stridor.  Stopped both antibiotics on 7/6/22  ---   Epinephrine x1 for upper airway stridor 7/3/22. Now taken off.   ---   Mucomyst neb ordered, but not wanting to use it. We will stop it. SP mucinex tabs.      Probable UTI  ---   Patient without urinary symptoms  ---   UA at admit was grossly negative for infection but urine culture grew => 100k colonies of pansensitive E. Coli  ---   Patient with generalized weakness.  ---   Continue ceftriaxone 2 g IV daily x 5 days; 7/2 - 7/6/22     MERON  ---   Suspect prerenal  ---   Admit creatinine was 0.8 ---> IVF ---> 1.12 ---> IVF ---> 1.54 --> 1.51 ---> IVF bolus ---> creatinine 0.92 ---> 0.56  ---   MERON resolved  ---   Discontinued IVF  ---   Reintroduce PTA losartan and Lasix today  ---   Avoid nephrotoxins     Alcoholic hepatitis  ---   AST/ALT ratio c/w alcoholic liver disease  ---   Abdominal ultrasound earlier this morning revealed steatosis  ---    Abstinence from alcohol strongly recommended     Severe hyponatremia  ---   Pt drinks whiskey and not beer thus less likely due to beer potomania  ---   Na level was 126 admit  ---   Serum osmolality 273 (low)  ---   Urine sodium is 37  ---   Urine osmolality 268 (low)  ---   Hyponatremia was probably due to volume depletion  ---   Na was up to 137, 6 days after admission  ---   IVF discontinued     HTN  ---   Well-controlled  ---   Reintroduce PTA losartan and Lasix      HLD  ---   PTA simvastatin on hold for transaminitis     Hypomagnesemia  ---   Mg level was 1.1   ---   On replacement protocol  ---   Resolved     Elevated lipase at 606 ---> 206  ---   Denied epigastric pain radiating to her back  ---   Denied nausea or vomiting this morning  ---   No evidence of acute pancreatitis   ---   Lipase is back to normal range     Thrombocytopenia  ---   Platelet count is 92k ---> 105k on 6/30/22  ---   Due to alcohol affecting bone marrow and possibly platelet sequestration in the spleen  ---   No evidence of bleeding  ---   Abstinence from alcohol strongly recommended     Severe obesity with BMI of 40.92 kg/m2  ---   Weight loss strongly recommended     Hypophosphatemia  ---   Replaced per protocol     Generalized weakness  ---   PT consulted     Diarrhea  ---   Onset prior to admission  ---   Stool C. diff toxins and enteric pathogens negative on 6/30/22  ---   C/w Imodium as needed     Generalized weakness  ---   PT/OT consulted     Universal COVID-19 screening  ---   Fully vaccinated but not boosted  ---   SARS-CoV-2 PCR negative on 6/28/2022     Diet: Fluid restriction 2000 ML FLUID  Regular Diet Adult    DVT Prophylaxis: Pneumatic Compression Devices  Will Catheter: Not present  Central Lines: None  Cardiac Monitoring: None  Code Status: Full Code        Disposition Plan    Anticipate transfer to station 3B when bed become available         Discharge Disposition:   Discharged to home           Condition on  "Discharge:   Discharge condition: Stable   Code status on discharge: Full Code           Procedures:   none          Consultations:   Consultation during this admission received from psychiatry.               Final Day of Progress before Discharge:       Physical Exam:  Blood pressure (!) 148/74, pulse 82, temperature 98.6  F (37  C), temperature source Oral, resp. rate 18, height 1.626 m (5' 4\"), weight 108 kg (238 lb), SpO2 93 %.  GENERAL: Alert and oriented x 3. NAD.   HEENT: Anicteric sclera. PERRL. Mucous membranes moist and without lesions.   CV: RRR. S1, S2. No murmurs appreciated.   RESPIRATORY: Effort normal. Lungs CTAB with no wheezing, rales, rhonchi.   GI: Abdomen soft and non distended with normoactive bowel sounds present in all quadrants. No tenderness, rebound, guarding.      Data:  All laboratory data reviewed             Significant Results:     Results for orders placed or performed during the hospital encounter of 06/28/22 (from the past 24 hour(s))   Magnesium   Result Value Ref Range    Magnesium 2.0 1.6 - 2.3 mg/dL   Magnesium   Result Value Ref Range    Magnesium 1.6 1.6 - 2.3 mg/dL   Phosphorus   Result Value Ref Range    Phosphorus 3.2 2.5 - 4.5 mg/dL      No results found for this or any previous visit (from the past 48 hour(s)).             Pending Results:   Unresulted Labs Ordered in the Past 30 Days of this Admission     No orders found from 5/29/2022 to 6/29/2022.                  Discharge Instructions and Follow-Up:     Discharge Procedure Orders   Reason for your hospital stay   Order Comments: Alcohol use disorder, depression.     Activity   Order Comments: Your activity upon discharge: activity as tolerated     Order Specific Question Answer Comments   Is discharge order? Yes      Adult [facility] Follow-up and recommended labs and tests   Order Comments: Follow up with primary care provider, Physician No Ref-Primary, within 7 days for hospital follow- up.  No follow up labs or " test are needed.  Follow-up with Psychiatry as out pt in 2-4 weeks for depression    Appointments on [facility] and/or [facility] (with [facility] or [facility] provider or service). Call [phone] if you haven't heard regarding these appointments within 7 days of discharge.     Walker Order   Order Comments: DME Documentation:   Describe the reason for need to support medical necessity: imparied gait.     I, the undersigned, certify that the above prescribed supplies are medically necessary for this patient and is both reasonable and necessary in reference to accepted standards of medical and necessary in reference to accepted standards of medical practice in the treatment of this patient's condition and is not prescribed as a convenience.     Order Specific Question Answer Comments   DME Provider: [facility]    Walker Type: Standard (2 Wheel)      Diet   Order Comments: Follow this diet upon discharge: Orders Placed This Encounter      Fluid restriction 2000 ML FLUID      Regular Diet Adult     Order Specific Question Answer Comments   Is discharge order? Yes         Jennifer Thurston MD  Internal Medicine Staff Hospitalist  [phone]  July 7, 2022        Time spent on patient: 40 minutes total including face to face and coordinating care time reviewing current illness, any medication changes, and the care plan for today.

## 2022-07-07 NOTE — PROGRESS NOTES
Writer is assisting SIMEON with setting patient up with an OP Psychiatrist.     Spoke to Jeovanny at [facility]- [phone] (where patient has a PCP) and requested they put in a referral for a psychiatrist. Per Jeovanny, someone will be calling back if they need more information from writer.     1:55 PM  Jeovanny called back and will be contacting a physician about putting a referral in for a psychiatrist. The clinic will be reaching out to patient after discharge.     2:25 PM  SIMEON updated patient's daughter about the information above.     4:05 PM  Spoke to Viky, ONIELCC at the clinic and she has been trying to get patient an outpatient psychiatrist for a few months but options are limited with her insurance and where she lives. Vulnerable Adult reports have been filed in the past and Viky verbalized that she will do another if needed in the future. Discussed with SIMEON.     [facility]  [address]  [place] [address]  [phone] (Work)  [phone] (Fax)    HECTOR Kwong RNCC  RN Care Coordinator   Office: [phone]   Pager: [phone]

## 2022-07-07 NOTE — DISCHARGE SUMMARY
Pt discharged home w/ her daughter and  at 1545. All education completed w/ AM RN. All questions and concerns addressed. No further needs at this time.

## 2022-07-07 NOTE — PLAN OF CARE
Pt. discharged at 1630 to home and left with personal belongings. Pt. received complete discharge paperwork and duloxetine medication filled by discharge pharmacy. Pt. was given times of last dose for all discharge medications in writing on discharge medication sheets. Discharge teaching included signs and symptoms of alcohol withdrawal, depression medication, VTE risk, fall risk & skin injury prevention. Pt. to follow up with PCP. Pt. had no further questions at the time of discharge and no unmet needs were identified.

## 2022-07-08 ENCOUNTER — PATIENT OUTREACH (OUTPATIENT)
Dept: CARE COORDINATION | Facility: CLINIC | Age: 72
End: 2022-07-08

## 2022-07-08 DIAGNOSIS — Z71.89 OTHER SPECIFIED COUNSELING: ICD-10-CM

## 2022-07-08 NOTE — PROGRESS NOTES
Clinic Care Coordination Contact  [facility]/Voicemail       Clinical Data: Care Coordinator Outreach    Outreach attempted x 1.  Left message on patient's voicemail with call back information and requested return call.    Plan:  Care Coordinator will try to reach patient again in 1-2 business days.    Gina Camacho, [facility]  [phone]  [facility]

## 2022-07-08 NOTE — PLAN OF CARE
Physical Therapy Discharge Summary    Reason for therapy discharge:    Discharged to home with home therapy.    Progress towards therapy goal(s). See goals on Care Plan in [facility] electronic health record for goal details.  Goals not met.  Barriers to achieving goals:   limited tolerance for therapy and discharge from facility.    Therapy recommendation(s):    Continued therapy is recommended.  Rationale/Recommendations:  Home PT for strengthening, increase independence with all functional mobility and home safety evaluation.

## 2022-07-09 NOTE — PROGRESS NOTES
Clinic Care Coordination Contact  [facility]/Voicemail     Clinical Data: Care Coordinator Outreach - TCM      Outreach attempted x 2.  Left message on patient's voicemail, providing [facility]'s 24/7 scheduling and nurse triage phone number [phone] ([phone]) for questions/concerns and/or to schedule an appt with an [facility] provider, if they do not have a PCP.      Plan:  Care Coordinator will do no further outreaches at this time.       Josiane Knowles RN  [facility], [facility]

## 2022-07-22 RX ORDER — DULOXETIN HYDROCHLORIDE 60 MG/1
60 CAPSULE, DELAYED RELEASE ORAL DAILY
Qty: 30 CAPSULE | Refills: 0 | Status: SHIPPED | OUTPATIENT
Start: 2022-07-22 | End: 2022-07-23

## 2022-07-23 RX ORDER — DULOXETIN HYDROCHLORIDE 60 MG/1
60 CAPSULE, DELAYED RELEASE ORAL DAILY
Qty: 30 CAPSULE | Refills: 0 | Status: SHIPPED | OUTPATIENT
Start: 2022-07-23 | End: 2022-07-24

## 2022-07-24 ENCOUNTER — NURSE TRIAGE (OUTPATIENT)
Dept: NURSING | Facility: CLINIC | Age: 72
End: 2022-07-24

## 2022-07-24 DIAGNOSIS — F10.930 ALCOHOL WITHDRAWAL SYNDROME WITHOUT COMPLICATION (H): ICD-10-CM

## 2022-07-24 DIAGNOSIS — F32.A DEPRESSION, UNSPECIFIED DEPRESSION TYPE: ICD-10-CM

## 2022-07-24 RX ORDER — LANOLIN ALCOHOL/MO/W.PET/CERES
100 CREAM (GRAM) TOPICAL DAILY
Qty: 30 TABLET | Refills: 0 | Status: SHIPPED | OUTPATIENT
Start: 2022-07-24

## 2022-07-24 RX ORDER — DULOXETIN HYDROCHLORIDE 60 MG/1
60 CAPSULE, DELAYED RELEASE ORAL DAILY
Qty: 30 CAPSULE | Refills: 0 | Status: SHIPPED | OUTPATIENT
Start: 2022-07-24

## 2022-07-24 RX ORDER — FOLIC ACID 1 MG/1
1 TABLET ORAL DAILY
Qty: 30 TABLET | Refills: 0 | Status: SHIPPED | OUTPATIENT
Start: 2022-07-24

## 2022-07-24 NOTE — TELEPHONE ENCOUNTER
Pt calling to check on status of her prescription for duloxetine     Pt was told it would be delivered to her house after being discharged from the hospital on 7/8 but nothing has showed up. She was also suppose to receive folic acid and thiamine     Appears meds were down at [facility] where she was discharged from     [facility]         Reason for Disposition    [1] Prescription prescribed recently is not at pharmacy AND [2] triager has access to patient's EMR AND [3] prescription is recorded in the EMR    Protocols used: MEDICATION QUESTION CALL-A-      Leigh Salinas RN [facility] July 24, 2022 5:17 PM

## 2024-07-11 NOTE — ED TRIAGE NOTES
On resumed eliquis      Pt presents BIB daughter for increasing depression, changes in behavior (not eating, lack of energy, abusing OTC pain medications, and alcohol abuse.) pt does not recall when her last drink was, but endorses drinking whiskey daily.      Triage Assessment     Row Name 06/28/22 [id number]       Triage Assessment (Adult)    Airway WDL X;WDL       Respiratory WDL    Respiratory WDL WDL       Skin Circulation/Temperature WDL    Skin Circulation/Temperature WDL WDL       Cardiac WDL    Cardiac WDL WDL       Peripheral/Neurovascular WDL    Peripheral Neurovascular WDL WDL       Cognitive/Neuro/Behavioral WDL    Cognitive/Neuro/Behavioral WDL WDL